# Patient Record
Sex: FEMALE | Race: WHITE | NOT HISPANIC OR LATINO | Employment: OTHER | URBAN - METROPOLITAN AREA
[De-identification: names, ages, dates, MRNs, and addresses within clinical notes are randomized per-mention and may not be internally consistent; named-entity substitution may affect disease eponyms.]

---

## 2023-03-13 DIAGNOSIS — K21.9 GASTROESOPHAGEAL REFLUX DISEASE, UNSPECIFIED WHETHER ESOPHAGITIS PRESENT: ICD-10-CM

## 2023-03-13 RX ORDER — SERTRALINE HYDROCHLORIDE 25 MG/1
25 TABLET, FILM COATED ORAL DAILY
COMMUNITY
Start: 2022-03-24 | End: 2023-03-23 | Stop reason: ALTCHOICE

## 2023-03-13 RX ORDER — EVOLOCUMAB 140 MG/ML
140 INJECTION, SOLUTION SUBCUTANEOUS
COMMUNITY
Start: 2022-01-03 | End: 2024-01-25 | Stop reason: ALTCHOICE

## 2023-03-13 RX ORDER — CLOBETASOL PROPIONATE 0.5 MG/G
0.05 CREAM TOPICAL 2 TIMES DAILY
COMMUNITY
Start: 2022-05-27 | End: 2024-01-25 | Stop reason: ALTCHOICE

## 2023-03-13 RX ORDER — MELOXICAM 7.5 MG/1
7.5 TABLET ORAL DAILY
COMMUNITY
Start: 2022-12-09 | End: 2024-01-25 | Stop reason: ALTCHOICE

## 2023-03-13 RX ORDER — OMEPRAZOLE 20 MG/1
20 CAPSULE, DELAYED RELEASE ORAL
COMMUNITY
Start: 2019-03-20 | End: 2023-03-13 | Stop reason: SDUPTHER

## 2023-03-13 RX ORDER — GABAPENTIN 300 MG/1
300 CAPSULE ORAL 3 TIMES DAILY
COMMUNITY
End: 2024-01-25 | Stop reason: ALTCHOICE

## 2023-03-13 RX ORDER — OMEPRAZOLE 20 MG/1
20 CAPSULE, DELAYED RELEASE ORAL
Qty: 90 CAPSULE | Refills: 3 | Status: SHIPPED | OUTPATIENT
Start: 2023-03-13 | End: 2023-06-19 | Stop reason: SDUPTHER

## 2023-03-13 RX ORDER — SERTRALINE HYDROCHLORIDE 50 MG/1
25 TABLET, FILM COATED ORAL DAILY
COMMUNITY
End: 2023-06-19 | Stop reason: ALTCHOICE

## 2023-03-22 PROBLEM — M51.36 NARROWING OF LUMBAR INTERVERTEBRAL DISC SPACE: Status: ACTIVE | Noted: 2023-03-22

## 2023-03-22 PROBLEM — M19.042 DEGENERATIVE ARTHRITIS OF METACARPOPHALANGEAL JOINT OF LEFT THUMB: Status: ACTIVE | Noted: 2023-03-22

## 2023-03-22 PROBLEM — M51.369 NARROWING OF LUMBAR INTERVERTEBRAL DISC SPACE: Status: ACTIVE | Noted: 2023-03-22

## 2023-03-22 PROBLEM — L40.9 PSORIASIS OF SCALP: Status: ACTIVE | Noted: 2023-03-22

## 2023-03-22 PROBLEM — E78.5 HYPERLIPIDEMIA: Status: ACTIVE | Noted: 2023-03-22

## 2023-03-22 PROBLEM — H93.12 TINNITUS OF LEFT EAR: Status: ACTIVE | Noted: 2023-03-22

## 2023-03-22 PROBLEM — Z86.73 RECENT CEREBROVASCULAR ACCIDENT (CVA): Status: ACTIVE | Noted: 2023-03-22

## 2023-03-22 PROBLEM — H90.3 BILATERAL SENSORINEURAL HEARING LOSS: Status: ACTIVE | Noted: 2023-03-22

## 2023-03-22 PROBLEM — R53.81 MALAISE AND FATIGUE: Status: ACTIVE | Noted: 2023-03-22

## 2023-03-22 PROBLEM — F51.05 INSOMNIA SECONDARY TO ANXIETY: Status: ACTIVE | Noted: 2023-03-22

## 2023-03-22 PROBLEM — R73.03 PREDIABETES: Status: ACTIVE | Noted: 2023-03-22

## 2023-03-22 PROBLEM — R53.83 MALAISE AND FATIGUE: Status: ACTIVE | Noted: 2023-03-22

## 2023-03-22 PROBLEM — M50.30 HERNIATION OF INTERVERTEBRAL DISC OF CERVICAL SPINE DUE TO DEGENERATION: Status: ACTIVE | Noted: 2023-03-22

## 2023-03-22 PROBLEM — F41.9 INSOMNIA SECONDARY TO ANXIETY: Status: ACTIVE | Noted: 2023-03-22

## 2023-03-22 PROBLEM — K21.9 GERD WITHOUT ESOPHAGITIS: Status: ACTIVE | Noted: 2023-03-22

## 2023-03-22 PROBLEM — M51.369 LUMBAR DEGENERATIVE DISC DISEASE: Status: ACTIVE | Noted: 2023-03-22

## 2023-03-22 PROBLEM — M48.07 STENOSIS OF LUMBOSACRAL SPINE: Status: ACTIVE | Noted: 2023-03-22

## 2023-03-22 PROBLEM — M51.36 LUMBAR DEGENERATIVE DISC DISEASE: Status: ACTIVE | Noted: 2023-03-22

## 2023-03-22 PROBLEM — M50.20 HERNIATION OF INTERVERTEBRAL DISC OF CERVICAL SPINE DUE TO DEGENERATION: Status: ACTIVE | Noted: 2023-03-22

## 2023-03-22 PROBLEM — F32.A CHRONIC DEPRESSION: Status: ACTIVE | Noted: 2023-03-22

## 2023-03-23 ENCOUNTER — OFFICE VISIT (OUTPATIENT)
Dept: PRIMARY CARE | Facility: CLINIC | Age: 78
End: 2023-03-23
Payer: MEDICARE

## 2023-03-23 VITALS
HEART RATE: 70 BPM | WEIGHT: 137 LBS | BODY MASS INDEX: 25.89 KG/M2 | SYSTOLIC BLOOD PRESSURE: 122 MMHG | DIASTOLIC BLOOD PRESSURE: 70 MMHG

## 2023-03-23 DIAGNOSIS — M62.830 SPASM OF MUSCLE OF LOWER BACK: ICD-10-CM

## 2023-03-23 DIAGNOSIS — K59.00 CONSTIPATION, UNSPECIFIED CONSTIPATION TYPE: ICD-10-CM

## 2023-03-23 DIAGNOSIS — R39.9 UTI SYMPTOMS: Primary | ICD-10-CM

## 2023-03-23 DIAGNOSIS — R39.12 WEAK URINARY STREAM: ICD-10-CM

## 2023-03-23 DIAGNOSIS — R10.9 FLANK PAIN: ICD-10-CM

## 2023-03-23 PROCEDURE — 1159F MED LIST DOCD IN RCRD: CPT | Performed by: FAMILY MEDICINE

## 2023-03-23 PROCEDURE — 99214 OFFICE O/P EST MOD 30 MIN: CPT | Performed by: FAMILY MEDICINE

## 2023-03-23 ASSESSMENT — PATIENT HEALTH QUESTIONNAIRE - PHQ9
1. LITTLE INTEREST OR PLEASURE IN DOING THINGS: NOT AT ALL
SUM OF ALL RESPONSES TO PHQ9 QUESTIONS 1 AND 2: 0
2. FEELING DOWN, DEPRESSED OR HOPELESS: NOT AT ALL

## 2023-03-23 NOTE — PROGRESS NOTES
Subjective   Patient ID: Padmaja Blunt is a 77 y.o. female who presents for left flank pain, constipation, and urinary symptoms.    HPI     Left flank pain/ Urinary Symptoms  Over the last 4 days she has noticed left flank pain.  She is concerned that this may be a back spasm or related to her urinary symptoms.    She does feel that her urinary stream is weaker than normal, increased frequency and urgency, but denies fever or chills.  Was denies hematuria, fever , or chills.   The pain worsens when she bends backwards at her hips.   She does have a history of degenerative changes in her lower back, and has seen pain management for cortisone injections.  She her last one was approximately 3 years ago.  Denies numbness or tingling or pain radiating into her left leg.      2. Constipation  Over the last 4 to 5 years she has noticed that her stool caliber is smaller, and occasionally will not have a bowel movement for 2 days.  Denies melena or hematochezia.  States she had a colonoscopy done approximately 2 years ago which per her was normal.    Review of Systems  All pertinent positive symptoms are included in the history of present illness.     All other systems have been reviewed and are negative and noncontributory to this patient's current ailments.  Objective   /70   Pulse 70   Wt 62.1 kg (137 lb)   BMI 25.89 kg/m²     Physical Exam  CONSTITUTIONAL - well nourished, well developed, looks like stated age, in no acute distress, not ill-appearing, and not tired appearing  SKIN - normal skin color and pigmentation, normal skin turgor without rash, lesions, or nodules visualized  HEAD - no trauma, normocephalic  EYES - normal external exam  CHEST -no distressed breathing, good effort  Abdomen -nontender, nondistended, positive bowel sounds  BACK -negative CVA tenderness, negative Celestine's punch, there is significant pinpoint tenderness located where the quadratus lumborum and obliques attach to the iliac  crest.  Her symptoms were reproduced with side bending and extension.  EXTREMITIES - no edema, no deformities  NEUROLOGICAL - normal balance, normal motor, no ataxia  PSYCHIATRIC - alert, pleasant and cordial, age-appropriate    Assessment/Plan   Left Flank Pain/muscle spasm  I suspect most of your symptoms are related to a muscle spasm of your lower back.  However, we did obtain a urinalysis and did a POCT UA in office today which did not show any leukocyte esterase, nitrites, or blood.  We will send these for formal urinalysis and microscopy with urine culture.  We will notify you of the results and make recommendations accordingly.  If your urine culture is negative, I would recommend discussing your symptoms with a Urogynecologist.  A referral was placed, and central scheduling number provided to the patient.  Recommend to use your meloxicam as needed for your back pain.    2. Constipation  We discussed options of improving your bowel habits with a daily schedule to increase your fiber intake through your diet or supplementation.  You can also purchase over-the-counter MiraLAX to help with your constipation.  I do recommend that you drink plenty of fluids.  If you notice any blood, pus, or mucous in your stool, please call the office and be seen to be further evaluated

## 2023-05-02 DIAGNOSIS — Z00.00 ROUTINE HEALTH MAINTENANCE: ICD-10-CM

## 2023-05-02 DIAGNOSIS — E78.5 HYPERLIPIDEMIA, UNSPECIFIED HYPERLIPIDEMIA TYPE: ICD-10-CM

## 2023-06-19 ENCOUNTER — OFFICE VISIT (OUTPATIENT)
Dept: PRIMARY CARE | Facility: CLINIC | Age: 78
End: 2023-06-19
Payer: MEDICARE

## 2023-06-19 VITALS
SYSTOLIC BLOOD PRESSURE: 120 MMHG | WEIGHT: 135 LBS | DIASTOLIC BLOOD PRESSURE: 70 MMHG | HEART RATE: 70 BPM | BODY MASS INDEX: 25.51 KG/M2

## 2023-06-19 DIAGNOSIS — F32.A CHRONIC DEPRESSION: ICD-10-CM

## 2023-06-19 DIAGNOSIS — G56.22 NEUROPATHY OF LEFT ULNAR NERVE AT WRIST: ICD-10-CM

## 2023-06-19 DIAGNOSIS — M79.2 NEUROPATHIC PAIN: ICD-10-CM

## 2023-06-19 DIAGNOSIS — K21.9 GERD WITHOUT ESOPHAGITIS: ICD-10-CM

## 2023-06-19 DIAGNOSIS — Z00.00 MEDICARE ANNUAL WELLNESS VISIT, SUBSEQUENT: Primary | ICD-10-CM

## 2023-06-19 DIAGNOSIS — Z23 NEED FOR TDAP VACCINATION: ICD-10-CM

## 2023-06-19 DIAGNOSIS — Z00.00 PHYSICAL EXAM, ANNUAL: ICD-10-CM

## 2023-06-19 PROCEDURE — 1159F MED LIST DOCD IN RCRD: CPT | Performed by: FAMILY MEDICINE

## 2023-06-19 PROCEDURE — 99214 OFFICE O/P EST MOD 30 MIN: CPT | Performed by: FAMILY MEDICINE

## 2023-06-19 PROCEDURE — 1170F FXNL STATUS ASSESSED: CPT | Performed by: FAMILY MEDICINE

## 2023-06-19 PROCEDURE — 1036F TOBACCO NON-USER: CPT | Performed by: FAMILY MEDICINE

## 2023-06-19 PROCEDURE — 99397 PER PM REEVAL EST PAT 65+ YR: CPT | Performed by: FAMILY MEDICINE

## 2023-06-19 PROCEDURE — 1160F RVW MEDS BY RX/DR IN RCRD: CPT | Performed by: FAMILY MEDICINE

## 2023-06-19 PROCEDURE — G0439 PPPS, SUBSEQ VISIT: HCPCS | Performed by: FAMILY MEDICINE

## 2023-06-19 RX ORDER — OMEPRAZOLE 20 MG/1
20 CAPSULE, DELAYED RELEASE ORAL
Qty: 90 CAPSULE | Refills: 1 | Status: SHIPPED | OUTPATIENT
Start: 2023-06-19 | End: 2024-01-25 | Stop reason: SDUPTHER

## 2023-06-19 RX ORDER — SERTRALINE HYDROCHLORIDE 25 MG/1
25 TABLET, FILM COATED ORAL DAILY
Qty: 90 TABLET | Refills: 1 | Status: SHIPPED | OUTPATIENT
Start: 2023-06-19 | End: 2024-01-25 | Stop reason: SDUPTHER

## 2023-06-19 ASSESSMENT — ENCOUNTER SYMPTOMS
DEPRESSION: 0
OCCASIONAL FEELINGS OF UNSTEADINESS: 0
LOSS OF SENSATION IN FEET: 0

## 2023-06-19 ASSESSMENT — ACTIVITIES OF DAILY LIVING (ADL)
TAKING_MEDICATION: INDEPENDENT
DOING_HOUSEWORK: INDEPENDENT
DRESSING: INDEPENDENT
GROCERY_SHOPPING: INDEPENDENT
MANAGING_FINANCES: INDEPENDENT
BATHING: INDEPENDENT

## 2023-06-19 NOTE — PROGRESS NOTES
Subjective   Reason for Visit: Padmaja Blunt is an 77 y.o. female here for a GERD, Anxiety, and Medicare Annual Wellness Visit Subsequent.     Past Medical, Surgical, and Family History reviewed and updated in chart.    Reviewed all medications by prescribing practitioner or clinical pharmacist (such as prescriptions, OTCs, herbal therapies and supplements) and documented in the medical record.    HPI  1.  Medicare wellness/physical exam.  Pap: last done   Mammogram: last done June 2022, no longer wishes to pursue mammogram  Colonoscopy: last done 2016, next due 2026  Immunizations: Up-to-date, Tdap 2015  Had blood work done May 2, 2023, noted on the chart    2.  Chronic depression.  Currently stable, and after last visit we decreased Zoloft from 50 mg daily to 25 mg daily  Since doing so, she has felt excellent, would like to continue 25 mg daily without cutting tablet in half    3.  GERD.  Currently stable with omeprazole, requesting refill    4.  Finger numbness.  About 2 weeks ago, she was helping scrape paint off of the floor, resting on her left hand, palmar aspect and since that time she has had numbness in her left fourth and fifth digits    5.  Nerve pain.  Has been experiencing some nerve pain that is fleeting, oftentimes for seconds, sometimes feels like a burning sensation right below the bra line in the right thoracic area without any subsequent rashes  She has received Shingrix, and is wondering if this could be shingles without the rash  Not interested in taking medication for her, just wanted to bring it up to my attention    Review of Systems  All pertinent positive symptoms are included in the history of present illness.    All other systems have been reviewed and are negative and noncontributory to this patient's current ailments.    Current Outpatient Medications   Medication Instructions    clobetasol (Temovate) 0.05 % cream 0.05 Applications, Topical, 2 times daily    gabapentin (NEURONTIN)  300 mg, oral, 3 times daily    meloxicam (MOBIC) 7.5 mg, oral, Daily    omeprazole (PRILOSEC) 20 mg, oral, Daily before breakfast    Repatha Syringe 140 mg, subcutaneous, Every 14 days    sertraline (ZOLOFT) 25 mg, oral, Daily     Allergies   Allergen Reactions    Cortisone Hives    Gabapentin Angioedema    Sulfa (Sulfonamide Antibiotics) Unknown     Immunization History   Administered Date(s) Administered    Influenza, Unspecified 12/09/2022    Moderna SARS-CoV-2 Vaccination 02/03/2021, 03/03/2021, 11/20/2021, 06/04/2022    Pneumococcal Conjugate, Unspecified 12/14/2018    Pneumococcal Polysaccharide PPSV23 08/07/2019    Tdap 05/01/2013, 08/11/2015    Zoster, Recombinant 02/01/2022, 04/18/2022    Zoster, Unspecified 07/11/2012     Past Surgical History:   Procedure Laterality Date    BREAST BIOPSY  06/09/2014    Biopsy Breast Open    COLONOSCOPY  06/09/2014    Complete Colonoscopy    MR HEAD ANGIO WO IV CONTRAST  11/29/2016    MR HEAD ANGIO WO IV CONTRAST 11/29/2016 [facility]    OTHER SURGICAL HISTORY  06/09/2014    Drainage Of Ovarian Cyst(S)    TUBAL LIGATION  06/09/2014    Tubal Ligation     Family History   Problem Relation Name Age of Onset    Hyperlipidemia Mother      Hypothyroidism Mother      Other (Cerebral hemorrhage) Father         Social History     Tobacco Use    Smoking status: Never    Smokeless tobacco: Never     Patient Self Assessment of Health Status  Patient Self Assessment: Good    Nutrition and Exercise  Current Diet: Well Balanced Diet  Adequate Fluid Intake: Yes  Caffeine: Yes  Exercise Frequency: Regularly    Functional Ability/Level of Safety  Cognitive Impairment Observed: No cognitive impairment observed  Cognitive Impairment Reported: No cognitive impairment reported by patient or family    Home Safety Risk Factors: None    Objective   Visit Vitals  /70   Pulse 70   Wt 61.2 kg (135 lb)   BMI 25.51 kg/m²   Smoking Status Never   BSA 1.62 m²      Physical  Exam  CONSTITUTIONAL - well nourished, well developed, looks like stated age, in no acute distress, not ill-appearing, and not tired appearing  SKIN - normal skin color and pigmentation, normal skin turgor without rash, lesions, or nodules visualized  HEAD - no trauma, normocephalic  EYES - pupils are equal and reactive to light, extraocular muscles are intact, and normal external exam  CHEST - clear to auscultation, no wheezing, no crackles and no rales, good effort  CARDIAC - regular rate and regular rhythm, no skipped beats, no murmur  EXTREMITIES - no edema, no deformities  NEUROLOGICAL - normal gait, normal balance, normal motor, no ataxia, alert, oriented and no focal signs, decrease sensation in left fourth and fifth digits  PSYCHIATRIC - alert, pleasant and cordial, age-appropriate    Assessment/Plan   Problem List Items Addressed This Visit       Chronic depression    Current Assessment & Plan     Stable, no changes to medication recommended         Relevant Medications    sertraline (Zoloft) 25 mg tablet    GERD without esophagitis    Current Assessment & Plan     Stable, no changes to medication recommended         Relevant Medications    omeprazole (PriLOSEC) 20 mg DR capsule    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    Current Assessment & Plan     Suspect this should improve with time  If no improvement over the next 2 to 3 weeks, notify me as I will then obtain EMG to determine if this is coming from the elbow or the wrist area  If it is coming from the elbow, ulnar nerve surgery may be warranted  The longer the nerve root is compressed, the more permanent the symptoms can become         Neuropathic pain    Current Assessment & Plan     I do not see any rash that would be indicative of shingles on your right thoracic area  This could be related to spinal cord concerns as well  Declined medication for this concern, but if it continues to persist, let me know          Other Visit Diagnoses        Medicare annual wellness visit, subsequent    -  Primary    Questions reviewed    Physical exam, annual        Complete history and physical examination was performed    Need for Tdap vaccination        Suggested getting Tdap updated at local pharmacy          Patient Care Team:  Isaias Live DO as PCP - General  Isaias Live DO as PCP - [employer] PCP

## 2023-06-19 NOTE — ASSESSMENT & PLAN NOTE
I do not see any rash that would be indicative of shingles on your right thoracic area  This could be related to spinal cord concerns as well  Declined medication for this concern, but if it continues to persist, let me know

## 2023-06-19 NOTE — ASSESSMENT & PLAN NOTE
Suspect this should improve with time  If no improvement over the next 2 to 3 weeks, notify me as I will then obtain EMG to determine if this is coming from the elbow or the wrist area  If it is coming from the elbow, ulnar nerve surgery may be warranted  The longer the nerve root is compressed, the more permanent the symptoms can become

## 2023-11-07 ENCOUNTER — OFFICE VISIT (OUTPATIENT)
Dept: PRIMARY CARE | Facility: CLINIC | Age: 78
End: 2023-11-07
Payer: MEDICARE

## 2023-11-07 DIAGNOSIS — Z23 FLU VACCINE NEED: Primary | ICD-10-CM

## 2023-11-07 PROCEDURE — 90662 IIV NO PRSV INCREASED AG IM: CPT | Performed by: FAMILY MEDICINE

## 2023-11-07 PROCEDURE — 1159F MED LIST DOCD IN RCRD: CPT | Performed by: FAMILY MEDICINE

## 2023-11-07 PROCEDURE — 1036F TOBACCO NON-USER: CPT | Performed by: FAMILY MEDICINE

## 2023-11-07 PROCEDURE — G0008 ADMIN INFLUENZA VIRUS VAC: HCPCS | Performed by: FAMILY MEDICINE

## 2023-11-07 PROCEDURE — 1160F RVW MEDS BY RX/DR IN RCRD: CPT | Performed by: FAMILY MEDICINE

## 2023-11-07 NOTE — PROGRESS NOTES
Subjective   Patient ID: Padmaja Blunt is a 78 y.o. female who presents for vaccination update(s)    Allergies   Allergen Reactions    Cortisone Hives    Gabapentin Angioedema    Sulfa (Sulfonamide Antibiotics) Unknown       Immunization History   Administered Date(s) Administered    Flu vaccine, quadrivalent, high-dose, preservative free, age 65y+ (FLUZONE) 11/07/2023    Influenza, Unspecified 10/08/2020, 12/09/2022    Moderna SARS-CoV-2 Vaccination 02/03/2021, 03/03/2021, 11/20/2021, 06/04/2022    Pneumococcal Conjugate, Unspecified 12/14/2018    Pneumococcal polysaccharide vaccine, 23-valent, age 2 years and older (PNEUMOVAX 23) 08/07/2019    Tdap vaccine, age 7 year and older (BOOSTRIX) 05/01/2013, 08/11/2015    Zoster vaccine, recombinant, adult (SHINGRIX) 02/01/2022, 04/18/2022    Zoster, Unspecified 07/11/2012       Assessment/Plan   Problem List Items Addressed This Visit       Flu vaccine need - Primary    Relevant Orders    Flu vaccine, quadrivalent, high-dose, preservative free, age 65y+ (FLUZONE) (Completed)      All questions were answered and you were counseled on the particular immunization(s) provided today

## 2024-01-25 ENCOUNTER — OFFICE VISIT (OUTPATIENT)
Dept: PRIMARY CARE | Facility: CLINIC | Age: 79
End: 2024-01-25
Payer: MEDICARE

## 2024-01-25 ENCOUNTER — LAB (OUTPATIENT)
Dept: LAB | Facility: LAB | Age: 79
End: 2024-01-25
Payer: MEDICARE

## 2024-01-25 VITALS
BODY MASS INDEX: 24.94 KG/M2 | DIASTOLIC BLOOD PRESSURE: 72 MMHG | SYSTOLIC BLOOD PRESSURE: 118 MMHG | HEART RATE: 62 BPM | WEIGHT: 132 LBS

## 2024-01-25 DIAGNOSIS — E78.2 MIXED HYPERLIPIDEMIA: ICD-10-CM

## 2024-01-25 DIAGNOSIS — M35.9 DISORDER OF CONNECTIVE TISSUE (MULTI): ICD-10-CM

## 2024-01-25 DIAGNOSIS — F32.A CHRONIC DEPRESSION: ICD-10-CM

## 2024-01-25 DIAGNOSIS — M85.89 OSTEOPENIA OF MULTIPLE SITES: ICD-10-CM

## 2024-01-25 DIAGNOSIS — Z00.00 MEDICARE ANNUAL WELLNESS VISIT, SUBSEQUENT: Primary | ICD-10-CM

## 2024-01-25 DIAGNOSIS — Z00.00 HEALTHCARE MAINTENANCE: ICD-10-CM

## 2024-01-25 DIAGNOSIS — Z78.0 POST-MENOPAUSE: ICD-10-CM

## 2024-01-25 DIAGNOSIS — K21.9 GERD WITHOUT ESOPHAGITIS: ICD-10-CM

## 2024-01-25 PROBLEM — E78.01 FAMILIAL HYPERCHOLESTEROLEMIA: Status: ACTIVE | Noted: 2024-01-25

## 2024-01-25 PROBLEM — Z23 FLU VACCINE NEED: Status: RESOLVED | Noted: 2023-11-07 | Resolved: 2024-01-25

## 2024-01-25 PROBLEM — F51.05 INSOMNIA SECONDARY TO ANXIETY: Status: RESOLVED | Noted: 2023-03-22 | Resolved: 2024-01-25

## 2024-01-25 PROBLEM — M19.019 PRIMARY LOCALIZED OSTEOARTHROSIS OF SHOULDER REGION: Status: ACTIVE | Noted: 2024-01-25

## 2024-01-25 PROBLEM — I63.9 CEREBRAL INFARCTION, UNSPECIFIED (MULTI): Status: ACTIVE | Noted: 2024-01-25

## 2024-01-25 PROBLEM — F41.9 INSOMNIA SECONDARY TO ANXIETY: Status: RESOLVED | Noted: 2023-03-22 | Resolved: 2024-01-25

## 2024-01-25 PROBLEM — H61.23 BILATERAL IMPACTED CERUMEN: Status: ACTIVE | Noted: 2020-10-27

## 2024-01-25 LAB
ALBUMIN SERPL BCP-MCNC: 4.3 G/DL (ref 3.4–5)
ALP SERPL-CCNC: 74 U/L (ref 33–136)
ALT SERPL W P-5'-P-CCNC: 7 U/L (ref 7–45)
ANION GAP SERPL CALC-SCNC: 11 MMOL/L (ref 10–20)
AST SERPL W P-5'-P-CCNC: 14 U/L (ref 9–39)
BILIRUB SERPL-MCNC: 0.5 MG/DL (ref 0–1.2)
BUN SERPL-MCNC: 17 MG/DL (ref 6–23)
CALCIUM SERPL-MCNC: 8.9 MG/DL (ref 8.6–10.3)
CHLORIDE SERPL-SCNC: 104 MMOL/L (ref 98–107)
CHOLEST SERPL-MCNC: 414 MG/DL (ref 0–199)
CHOLESTEROL/HDL RATIO: 9.6
CO2 SERPL-SCNC: 26 MMOL/L (ref 21–32)
CREAT SERPL-MCNC: 0.79 MG/DL (ref 0.5–1.05)
EGFRCR SERPLBLD CKD-EPI 2021: 77 ML/MIN/1.73M*2
GLUCOSE SERPL-MCNC: 89 MG/DL (ref 74–99)
HDLC SERPL-MCNC: 43.3 MG/DL
LDLC SERPL CALC-MCNC: 330 MG/DL
NON HDL CHOLESTEROL: 371 MG/DL (ref 0–149)
POTASSIUM SERPL-SCNC: 4.4 MMOL/L (ref 3.5–5.3)
PROT SERPL-MCNC: 7.4 G/DL (ref 6.4–8.2)
SODIUM SERPL-SCNC: 137 MMOL/L (ref 136–145)
TRIGL SERPL-MCNC: 203 MG/DL (ref 0–149)
VLDL: 41 MG/DL (ref 0–40)

## 2024-01-25 PROCEDURE — 1157F ADVNC CARE PLAN IN RCRD: CPT | Performed by: FAMILY MEDICINE

## 2024-01-25 PROCEDURE — 1160F RVW MEDS BY RX/DR IN RCRD: CPT | Performed by: FAMILY MEDICINE

## 2024-01-25 PROCEDURE — 80061 LIPID PANEL: CPT

## 2024-01-25 PROCEDURE — 1123F ACP DISCUSS/DSCN MKR DOCD: CPT | Performed by: FAMILY MEDICINE

## 2024-01-25 PROCEDURE — 1036F TOBACCO NON-USER: CPT | Performed by: FAMILY MEDICINE

## 2024-01-25 PROCEDURE — 99397 PER PM REEVAL EST PAT 65+ YR: CPT | Performed by: FAMILY MEDICINE

## 2024-01-25 PROCEDURE — 1159F MED LIST DOCD IN RCRD: CPT | Performed by: FAMILY MEDICINE

## 2024-01-25 PROCEDURE — 99213 OFFICE O/P EST LOW 20 MIN: CPT | Performed by: FAMILY MEDICINE

## 2024-01-25 PROCEDURE — 36415 COLL VENOUS BLD VENIPUNCTURE: CPT

## 2024-01-25 PROCEDURE — 1158F ADVNC CARE PLAN TLK DOCD: CPT | Performed by: FAMILY MEDICINE

## 2024-01-25 PROCEDURE — G0439 PPPS, SUBSEQ VISIT: HCPCS | Performed by: FAMILY MEDICINE

## 2024-01-25 PROCEDURE — 80053 COMPREHEN METABOLIC PANEL: CPT

## 2024-01-25 PROCEDURE — 1170F FXNL STATUS ASSESSED: CPT | Performed by: FAMILY MEDICINE

## 2024-01-25 RX ORDER — SERTRALINE HYDROCHLORIDE 25 MG/1
25 TABLET, FILM COATED ORAL DAILY
Qty: 90 TABLET | Refills: 1 | Status: SHIPPED | OUTPATIENT
Start: 2024-01-25 | End: 2024-07-23

## 2024-01-25 RX ORDER — OMEPRAZOLE 20 MG/1
20 CAPSULE, DELAYED RELEASE ORAL
Qty: 90 CAPSULE | Refills: 1 | Status: SHIPPED | OUTPATIENT
Start: 2024-01-25 | End: 2024-07-23

## 2024-01-25 ASSESSMENT — ACTIVITIES OF DAILY LIVING (ADL)
DRESSING: INDEPENDENT
BATHING: INDEPENDENT
GROCERY_SHOPPING: INDEPENDENT
MANAGING_FINANCES: INDEPENDENT
DOING_HOUSEWORK: INDEPENDENT
TAKING_MEDICATION: INDEPENDENT

## 2024-01-25 ASSESSMENT — PATIENT HEALTH QUESTIONNAIRE - PHQ9
SUM OF ALL RESPONSES TO PHQ9 QUESTIONS 1 AND 2: 3
2. FEELING DOWN, DEPRESSED OR HOPELESS: NOT AT ALL
1. LITTLE INTEREST OR PLEASURE IN DOING THINGS: NOT AT ALL
2. FEELING DOWN, DEPRESSED OR HOPELESS: NOT AT ALL
1. LITTLE INTEREST OR PLEASURE IN DOING THINGS: NEARLY EVERY DAY
SUM OF ALL RESPONSES TO PHQ9 QUESTIONS 1 AND 2: 0

## 2024-01-25 ASSESSMENT — ENCOUNTER SYMPTOMS
LOSS OF SENSATION IN FEET: 0
DEPRESSION: 0
OCCASIONAL FEELINGS OF UNSTEADINESS: 0

## 2024-01-25 NOTE — ASSESSMENT & PLAN NOTE
Due to the fact you continue to have significant elevation in your cholesterol and have not been able to tolerate statin therapy and could not afford Repatha, we chose to offer you a CT cardiac score  Please have it done at your earliest convenience after which we will make recommendations

## 2024-01-25 NOTE — PROGRESS NOTES
Subjective   Reason for Visit: Padmaja Blunt is an 78 y.o. female here for a Medicare Annual Wellness Visit Subsequent, GERD, and Anxiety.     Past Medical, Surgical, and Family History reviewed and updated in chart.    Reviewed all medications by prescribing practitioner or clinical pharmacist (such as prescriptions, OTCs, herbal therapies and supplements) and documented in the medical record.    HPI  1.  Medicare wellness/physical exam.  Pap: aged out  Mammogram: last done June 2022, no longer wishes to pursue mammogram  Colonoscopy: last done 2016, next due 2026, technically aged out, not interested in pursuing at this time  Immunizations: UTD, Tdap 2015  Dexa scan in 2021 showed osteopenia, willing to have another one done    2.  Chronic depression.  Currently stable on Zoloft 25 mg daily  Denies side effects and requesting refills     3.  GERD.  Currently stable with omeprazole, requesting refill    4.  Hyperlipidemia  Fasting for bloodwork today   Hx of intolerance to statins  Yearly checks with [employer] through her Scientologist  Interested in considering CT cardiac score  Denies CP, SOB     Review of Systems  All pertinent positive symptoms are included in the history of present illness.    All other systems have been reviewed and are negative and noncontributory to this patient's current ailments.    Past Medical History:   Diagnosis Date    Depression, unspecified 12/09/2022    Chronic depression    Encounter for general adult medical examination without abnormal findings 06/11/2021    Encounter for Medicare annual wellness exam    Encounter for general adult medical examination without abnormal findings 01/24/2020    Encounter for Medicare annual wellness exam    Hyperlipidemia, unspecified 12/09/2022    Hyperlipidemia    Other abnormal findings on diagnostic imaging of central nervous system 01/11/2017    MRI of brain abnormal    Other malaise 01/11/2017    Physical deconditioning    Personal history of  other diseases of the musculoskeletal system and connective tissue 06/11/2021    History of connective tissue disease    Personal history of other diseases of the respiratory system 11/19/2018    History of paranasal sinus congestion    Radiculopathy, lumbar region     Left lumbar radiculopathy    Restlessness and agitation 06/11/2021    Restless upper extremity     Past Surgical History:   Procedure Laterality Date    BREAST BIOPSY  06/09/2014    Biopsy Breast Open    COLONOSCOPY  06/09/2014    Complete Colonoscopy    MR HEAD ANGIO WO IV CONTRAST  11/29/2016    MR HEAD ANGIO WO IV CONTRAST 11/29/2016 [facility]    OTHER SURGICAL HISTORY  06/09/2014    Drainage Of Ovarian Cyst(S)    TUBAL LIGATION  06/09/2014    Tubal Ligation     Social History     Tobacco Use    Smoking status: Never    Smokeless tobacco: Never     Family History   Problem Relation Name Age of Onset    Hyperlipidemia Mother      Hypothyroidism Mother      Other (Cerebral hemorrhage) Father       Allergies   Allergen Reactions    Cortisone Hives    Gabapentin Angioedema    Sulfa (Sulfonamide Antibiotics) Unknown     Immunization History   Administered Date(s) Administered    Flu vaccine (IIV4), preservative free *Check age/dose* 10/16/2014    Flu vaccine, quadrivalent, high-dose, preservative free, age 65y+ (FLUZONE) 11/07/2023    Influenza, Unspecified 10/08/2020, 12/09/2022    Influenza, seasonal, injectable 10/16/2012    Moderna SARS-CoV-2 Vaccination 02/03/2021, 03/03/2021, 11/20/2021, 06/04/2022    Pneumococcal Conjugate, Unspecified 12/14/2018    Pneumococcal polysaccharide vaccine, 23-valent, age 2 years and older (PNEUMOVAX 23) 08/07/2019    Tdap vaccine, age 7 year and older (BOOSTRIX) 05/01/2013, 08/11/2015    Zoster vaccine, recombinant, adult (SHINGRIX) 02/01/2022, 04/18/2022    Zoster, Unspecified 07/11/2012     Current Outpatient Medications   Medication Instructions    omeprazole (PRILOSEC) 20 mg, oral, Daily before  breakfast    sertraline (ZOLOFT) 25 mg, oral, Daily       Patient Self Assessment of Health Status  Patient Self Assessment: Good    Nutrition and Exercise  Current Diet: Well Balanced Diet  Adequate Fluid Intake: Yes  Caffeine: Yes  Exercise Frequency: Infrequently    Functional Ability/Level of Safety  Cognitive Impairment Observed: No cognitive impairment observed  Cognitive Impairment Reported: No cognitive impairment reported by patient or family    Home Safety Risk Factors: None    Objective   Visit Vitals  /72   Pulse 62   Wt 59.9 kg (132 lb)   BMI 24.94 kg/m²   Smoking Status Never   BSA 1.61 m²      No visits with results within 1 Month(s) from this visit.   Latest known visit with results is:   Legacy Encounter on 12/09/2022   Component Date Value    TSH 12/09/2022 1.60     Cholesterol 12/09/2022 368 (H)     HDL 12/09/2022 54.2     Cholesterol/HDL Ratio 12/09/2022 6.8 (A)     LDL 12/09/2022 282 (H)     VLDL 12/09/2022 31     Triglycerides 12/09/2022 157 (H)     Glucose 12/09/2022 91     Sodium 12/09/2022 139     Potassium 12/09/2022 4.7     Chloride 12/09/2022 105     Bicarbonate 12/09/2022 29     Anion Gap 12/09/2022 10     Urea Nitrogen 12/09/2022 18     Creatinine 12/09/2022 0.72     GFR Female 12/09/2022 86     Calcium 12/09/2022 9.6     Albumin 12/09/2022 4.3     Alkaline Phosphatase 12/09/2022 84     Total Protein 12/09/2022 7.2     AST 12/09/2022 16     Total Bilirubin 12/09/2022 0.4     ALT (SGPT) 12/09/2022 8     WBC 12/09/2022 5.6     RBC 12/09/2022 4.52     Hemoglobin 12/09/2022 13.6     Hematocrit 12/09/2022 41.8     MCV 12/09/2022 92     MCHC 12/09/2022 32.5     Platelets 12/09/2022 305     RDW 12/09/2022 12.6     Neutrophils % 12/09/2022 66.3     Immature Granulocytes %,* 12/09/2022 0.2     Lymphocytes % 12/09/2022 25.3     Monocytes % 12/09/2022 5.9     Eosinophils % 12/09/2022 1.6     Basophils % 12/09/2022 0.7     Neutrophils Absolute 12/09/2022 3.70     Lymphocytes Absolute  12/09/2022 1.41     Monocytes Absolute 12/09/2022 0.33     Eosinophils Absolute 12/09/2022 0.09     Basophils Absolute 12/09/2022 0.04        Physical Exam  CONSTITUTIONAL - well nourished, well developed, looks like stated age, in no acute distress, not ill-appearing, and not tired appearing  SKIN - normal skin color and pigmentation, normal skin turgor without rash, lesions, or nodules visualized  HEAD - no trauma, normocephalic  EYES - pupils are equal and reactive to light, extraocular muscles are intact, and normal external exam  ENT - uvula midline, normal tongue movement and throat normal, no exudate, nasal passage without discharge and patent  NECK - supple without rigidity, no neck mass was observed  CHEST - clear to auscultation, no wheezing, no crackles and no rales, good effort  CARDIAC - regular rate and regular rhythm, no skipped beats, no murmur  ABDOMEN - no organomegaly, soft, nontender, nondistended, normal bowel sounds  EXTREMITIES - no obvious or evident edema, no obvious or evident deformities  NEUROLOGICAL - normal gait, normal balance, normal motor, no ataxia; alert, oriented and no focal signs  PSYCHIATRIC - alert, pleasant and cordial, age-appropriate  IMMUNOLOGIC - no cervical lymphadenopathy    Assessment/Plan   Problem List Items Addressed This Visit       Chronic depression    Current Assessment & Plan     Stable, no changes to medication recommended         Relevant Medications    sertraline (Zoloft) 25 mg tablet    GERD without esophagitis    Current Assessment & Plan     Stable, no changes to medication recommended         Relevant Medications    omeprazole (PriLOSEC) 20 mg DR capsule    Hyperlipidemia    Current Assessment & Plan     Due to the fact you continue to have significant elevation in your cholesterol and have not been able to tolerate statin therapy and could not afford Repatha, we chose to offer you a CT cardiac score  Please have it done at your earliest convenience  after which we will make recommendations         Relevant Orders    Lipid Panel    Comprehensive Metabolic Panel    CT cardiac scoring wo IV contrast    Disorder of connective tissue (CMS/HCC)    Current Assessment & Plan     Currently asymptomatic, not taking medication for this concern         Medicare annual wellness visit, subsequent - Primary    Current Assessment & Plan     Questions were answered and reviewed  Screening up-to-date  Immunizations up-to-date         Healthcare maintenance    Current Assessment & Plan     Complete history and physical examination was performed  We will notify of test results once available         Osteopenia of multiple sites    Current Assessment & Plan     You are overdue for DEXA scan, I will send an order, please schedule at your earliest convenience         Relevant Orders    XR DEXA bone density    Post-menopause    Current Assessment & Plan     You are overdue for DEXA scan, I will send an order, please schedule at your earliest convenience         Relevant Orders    XR DEXA bone density     Patient Care Team:  Isaias Live DO as PCP - General (Family Medicine)  Isaias Live DO as PCP - [employer] PCP

## 2024-01-25 NOTE — RESULT ENCOUNTER NOTE
Cholesterol 414 with  please get that CT cardiac score for me  Sugar, kidney, liver, electrolytes normal

## 2024-03-20 ENCOUNTER — HOSPITAL ENCOUNTER (OUTPATIENT)
Dept: RADIOLOGY | Facility: CLINIC | Age: 79
Discharge: HOME | End: 2024-03-20
Payer: MEDICARE

## 2024-03-20 DIAGNOSIS — M85.89 OSTEOPENIA OF MULTIPLE SITES: ICD-10-CM

## 2024-03-20 DIAGNOSIS — E78.2 MIXED HYPERLIPIDEMIA: ICD-10-CM

## 2024-03-20 DIAGNOSIS — Z78.0 POST-MENOPAUSE: ICD-10-CM

## 2024-03-20 PROCEDURE — 75571 CT HRT W/O DYE W/CA TEST: CPT

## 2024-03-20 PROCEDURE — 77080 DXA BONE DENSITY AXIAL: CPT

## 2024-03-20 NOTE — RESULT ENCOUNTER NOTE
Happy to see that your CT cardiac score is only 4.45 which means that there is almost no identifiable coronary artery plaque in your coronary arteries

## 2024-03-24 NOTE — RESULT ENCOUNTER NOTE
Your recent bone density scan has shown osteopenia, which is a condition where bone density is lower than normal. This was particularly noted in your lumbar spine (lower back) and the left femur area (thigh bone).    When compared to your previous scan, your bone density appears to be relatively stable and essentially unchanged. This is a positive sign, as it suggests that your bone density is not decreasing significantly over time.    Given these results, I recommend that you ensure your daily intake of Vitamin D is at least 2000 IU and calcium is 1500 mg. These nutrients are crucial for bone health.    Here are the specific results:    - Left femoral neck: T score of -1.7 (previous exam: -1.6)  - Left femur total: T score of -1.4 (unchanged from previous exam)  - Lumbar spine L1-L4: T score of -1.9 (previous exam: -2.0)    For your understanding, T-scores are a standard deviation from what's considered normal. A T-score between -1.0 and -2.5 indicates osteopenia, and a T-score lower than -2.5 is classified as osteoporosis. Your scores fall within the osteopenia range, which suggests you have lower than normal bone density, but not severe enough to be classified as osteoporosis.

## 2024-06-21 ENCOUNTER — OFFICE VISIT (OUTPATIENT)
Dept: PRIMARY CARE | Facility: CLINIC | Age: 79
End: 2024-06-21
Payer: MEDICARE

## 2024-06-21 VITALS
DIASTOLIC BLOOD PRESSURE: 68 MMHG | OXYGEN SATURATION: 93 % | TEMPERATURE: 98.6 F | HEART RATE: 80 BPM | SYSTOLIC BLOOD PRESSURE: 120 MMHG

## 2024-06-21 DIAGNOSIS — J34.89 SINUS DRAINAGE: ICD-10-CM

## 2024-06-21 DIAGNOSIS — J01.90 ACUTE NON-RECURRENT SINUSITIS, UNSPECIFIED LOCATION: Primary | ICD-10-CM

## 2024-06-21 PROBLEM — M51.369 LUMBAR DEGENERATIVE DISC DISEASE: Status: RESOLVED | Noted: 2023-03-22 | Resolved: 2024-06-21

## 2024-06-21 PROBLEM — Z00.00 HEALTHCARE MAINTENANCE: Status: RESOLVED | Noted: 2024-01-25 | Resolved: 2024-06-21

## 2024-06-21 PROBLEM — M48.07 STENOSIS OF LUMBOSACRAL SPINE: Status: RESOLVED | Noted: 2023-03-22 | Resolved: 2024-06-21

## 2024-06-21 PROBLEM — M51.36 LUMBAR DEGENERATIVE DISC DISEASE: Status: RESOLVED | Noted: 2023-03-22 | Resolved: 2024-06-21

## 2024-06-21 PROBLEM — M79.2 NEUROPATHIC PAIN: Status: RESOLVED | Noted: 2023-06-19 | Resolved: 2024-06-21

## 2024-06-21 PROBLEM — H61.23 BILATERAL IMPACTED CERUMEN: Status: RESOLVED | Noted: 2020-10-27 | Resolved: 2024-06-21

## 2024-06-21 PROCEDURE — 1123F ACP DISCUSS/DSCN MKR DOCD: CPT | Performed by: FAMILY MEDICINE

## 2024-06-21 PROCEDURE — 1036F TOBACCO NON-USER: CPT | Performed by: FAMILY MEDICINE

## 2024-06-21 PROCEDURE — 1159F MED LIST DOCD IN RCRD: CPT | Performed by: FAMILY MEDICINE

## 2024-06-21 PROCEDURE — 1157F ADVNC CARE PLAN IN RCRD: CPT | Performed by: FAMILY MEDICINE

## 2024-06-21 PROCEDURE — 99214 OFFICE O/P EST MOD 30 MIN: CPT | Performed by: FAMILY MEDICINE

## 2024-06-21 RX ORDER — METHYLPREDNISOLONE 4 MG/1
TABLET ORAL
Qty: 21 TABLET | Refills: 0 | Status: SHIPPED | OUTPATIENT
Start: 2024-06-21

## 2024-06-21 RX ORDER — DOXYCYCLINE 100 MG/1
100 CAPSULE ORAL 2 TIMES DAILY
Qty: 14 CAPSULE | Refills: 0 | Status: SHIPPED | OUTPATIENT
Start: 2024-06-21 | End: 2024-06-28

## 2024-06-21 ASSESSMENT — PATIENT HEALTH QUESTIONNAIRE - PHQ9
2. FEELING DOWN, DEPRESSED OR HOPELESS: NOT AT ALL
SUM OF ALL RESPONSES TO PHQ9 QUESTIONS 1 AND 2: 0
1. LITTLE INTEREST OR PLEASURE IN DOING THINGS: NOT AT ALL

## 2024-06-21 NOTE — ASSESSMENT & PLAN NOTE
Risks, benefits, and options of treatment(s) were discussed after reviewing all current medication(s) and drug allergy(ies)  I opted for the treatment that we discussed with instructions on the medication use for your underlying medical ailment(s)  I encouraged supportive care such as rest, fluids and Advil/Tylenol as warranted  Return to the clinic in 7-10 days or sooner if symptoms worsen or persist as we will then further evaluate

## 2024-06-21 NOTE — PROGRESS NOTES
"Subjective   Patient ID: Padmaja Blunt \"Mai\" is a 78 y.o. female who presents for Cough.    Past Medical, Surgical, and Family History reviewed and updated in chart.    Reviewed all medications by prescribing practitioner or clinical pharmacist (such as prescriptions, OTCs, herbal therapies and supplements) and documented in the medical record.    HPI  Mai states that her symptoms began Monday (6/17) with a non-productive cough.  Her  had mentioned symptoms that she was experiencing earlier this week at which time we recommended that she try OTC mucinex.     Mai states that the mucinex caused an increase in nasal drainage that was clear in color. She denies any fevers or chills, but has had pneumonia in the past. Her home COVID test was negative. Mai denies any shortness of breath or chest pain but does endorse a sore throat.    Review of Systems  All pertinent positive symptoms are included in the history of present illness.    All other systems have been reviewed and are negative and noncontributory to this patient's current ailments.    Past Medical History:   Diagnosis Date    Depression, unspecified 12/09/2022    Chronic depression    Encounter for general adult medical examination without abnormal findings 06/11/2021    Encounter for Medicare annual wellness exam    Encounter for general adult medical examination without abnormal findings 01/24/2020    Encounter for Medicare annual wellness exam    Hyperlipidemia, unspecified 12/09/2022    Hyperlipidemia    Other abnormal findings on diagnostic imaging of central nervous system 01/11/2017    MRI of brain abnormal    Other malaise 01/11/2017    Physical deconditioning    Personal history of other diseases of the musculoskeletal system and connective tissue 06/11/2021    History of connective tissue disease    Personal history of other diseases of the respiratory system 11/19/2018    History of paranasal sinus congestion    Radiculopathy, lumbar " region     Left lumbar radiculopathy    Restlessness and agitation 06/11/2021    Restless upper extremity     Past Surgical History:   Procedure Laterality Date    BREAST BIOPSY  06/09/2014    Biopsy Breast Open    COLONOSCOPY  06/09/2014    Complete Colonoscopy    MR HEAD ANGIO WO IV CONTRAST  11/29/2016    MR HEAD ANGIO WO IV CONTRAST 11/29/2016 [facility]    OTHER SURGICAL HISTORY  06/09/2014    Drainage Of Ovarian Cyst(S)    TUBAL LIGATION  06/09/2014    Tubal Ligation     Social History     Tobacco Use    Smoking status: Never    Smokeless tobacco: Never     Family History   Problem Relation Name Age of Onset    Hyperlipidemia Mother      Hypothyroidism Mother      Other (Cerebral hemorrhage) Father       Immunization History   Administered Date(s) Administered    Flu vaccine (IIV4), preservative free *Check age/dose* 10/16/2014    Flu vaccine, quadrivalent, high-dose, preservative free, age 65y+ (FLUZONE) 11/07/2023    Influenza, Unspecified 10/08/2020, 12/09/2022    Influenza, seasonal, injectable 10/16/2012    Moderna SARS-CoV-2 Vaccination 02/03/2021, 03/03/2021, 11/20/2021, 06/04/2022    Pneumococcal Conjugate, Unspecified 12/14/2018    Pneumococcal polysaccharide vaccine, 23-valent, age 2 years and older (PNEUMOVAX 23) 08/07/2019    Tdap vaccine, age 7 year and older (BOOSTRIX, ADACEL) 05/01/2013, 08/11/2015    Zoster vaccine, recombinant, adult (SHINGRIX) 02/01/2022, 04/18/2022    Zoster, Unspecified 07/11/2012     Current Outpatient Medications   Medication Instructions    doxycycline (VIBRAMYCIN) 100 mg, oral, 2 times daily, Take with at least 8 ounces (large glass) of water, do not lie down for 30 minutes after    methylPREDNISolone (Medrol, Rad,) 4 mg tablets Follow schedule on package instructions    omeprazole (PRILOSEC) 20 mg, oral, Daily before breakfast    sertraline (ZOLOFT) 25 mg, oral, Daily     Allergies   Allergen Reactions    Cortisone Hives    Gabapentin Angioedema    Sulfa  (Sulfonamide Antibiotics) Unknown     Objective   Vitals:    06/21/24 1125   BP: 120/68   BP Location: Left arm   Patient Position: Sitting   BP Cuff Size: Adult   Pulse: 80   Temp: 37 °C (98.6 °F)   SpO2: 93%     There is no height or weight on file to calculate BMI.    BP Readings from Last 3 Encounters:   06/21/24 120/68   01/25/24 118/72   06/19/23 120/70      Wt Readings from Last 3 Encounters:   01/25/24 59.9 kg (132 lb)   06/19/23 61.2 kg (135 lb)   03/23/23 62.1 kg (137 lb)        No visits with results within 1 Month(s) from this visit.   Latest known visit with results is:   Lab on 01/25/2024   Component Date Value    Cholesterol 01/25/2024 414 (H)     HDL-Cholesterol 01/25/2024 43.3     Cholesterol/HDL Ratio 01/25/2024 9.6     LDL Calculated 01/25/2024 330 (H)     VLDL 01/25/2024 41 (H)     Triglycerides 01/25/2024 203 (H)     Non HDL Cholesterol 01/25/2024 371 (H)     Glucose 01/25/2024 89     Sodium 01/25/2024 137     Potassium 01/25/2024 4.4     Chloride 01/25/2024 104     Bicarbonate 01/25/2024 26     Anion Gap 01/25/2024 11     Urea Nitrogen 01/25/2024 17     Creatinine 01/25/2024 0.79     eGFR 01/25/2024 77     Calcium 01/25/2024 8.9     Albumin 01/25/2024 4.3     Alkaline Phosphatase 01/25/2024 74     Total Protein 01/25/2024 7.4     AST 01/25/2024 14     Bilirubin, Total 01/25/2024 0.5     ALT 01/25/2024 7      Physical Exam  CONSTITUTIONAL - slightly ill-appearing female in no acute distress  SKIN - tan skin with one pink fleshy colored nodule on her right forearm   HEAD - no trauma, normocephalic  EYES - extraocular muscles are intact, and normal external exam  ENT - uvula midline, normal tongue movement and throat normal, no exudate, nasal passage without discharge and patent  NECK - supple without rigidity, no neck mass was observed, no thyromegaly or thyroid nodules  CHEST - clear to auscultation, no wheezing, no crackles and no rales, good effort  CARDIAC - regular rate and regular rhythm,  no skipped beats, no murmur  EXTREMITIES - no obvious or evident edema, no obvious or evident deformities  NEUROLOGICAL - normal gait, normal balance, normal motor, alert, oriented and no focal signs  PSYCHIATRIC - alert, pleasant and cordial, age-appropriate    Assessment/Plan   Problem List Items Addressed This Visit       Acute non-recurrent sinusitis - Primary     Risks, benefits, and options of treatment(s) were discussed after reviewing all current medication(s) and drug allergy(ies)  I opted for the treatment that we discussed with instructions on the medication use for your underlying medical ailment(s)  I encouraged supportive care such as rest, fluids and Advil/Tylenol as warranted  Return to the clinic in 7-10 days or sooner if symptoms worsen or persist as we will then further evaluate          Relevant Medications    doxycycline (Vibramycin) 100 mg capsule    methylPREDNISolone (Medrol, Rad,) 4 mg tablets    Sinus drainage     An antibiotic and steroid taper have both been sent to the pharmacy.          Relevant Medications    doxycycline (Vibramycin) 100 mg capsule    methylPREDNISolone (Medrol, Rad,) 4 mg tablets

## 2024-08-06 ENCOUNTER — APPOINTMENT (OUTPATIENT)
Dept: PRIMARY CARE | Facility: CLINIC | Age: 79
End: 2024-08-06
Payer: MEDICARE

## 2024-08-07 ENCOUNTER — APPOINTMENT (OUTPATIENT)
Dept: PRIMARY CARE | Facility: CLINIC | Age: 79
End: 2024-08-07
Payer: MEDICARE

## 2024-08-07 VITALS
HEIGHT: 62 IN | WEIGHT: 137 LBS | SYSTOLIC BLOOD PRESSURE: 118 MMHG | DIASTOLIC BLOOD PRESSURE: 68 MMHG | BODY MASS INDEX: 25.21 KG/M2 | HEART RATE: 98 BPM

## 2024-08-07 DIAGNOSIS — F32.A CHRONIC DEPRESSION: ICD-10-CM

## 2024-08-07 DIAGNOSIS — K21.9 GERD WITHOUT ESOPHAGITIS: ICD-10-CM

## 2024-08-07 DIAGNOSIS — E78.2 MIXED HYPERLIPIDEMIA: Primary | ICD-10-CM

## 2024-08-07 PROCEDURE — 1123F ACP DISCUSS/DSCN MKR DOCD: CPT | Performed by: FAMILY MEDICINE

## 2024-08-07 PROCEDURE — 1160F RVW MEDS BY RX/DR IN RCRD: CPT | Performed by: FAMILY MEDICINE

## 2024-08-07 PROCEDURE — 99214 OFFICE O/P EST MOD 30 MIN: CPT | Performed by: FAMILY MEDICINE

## 2024-08-07 PROCEDURE — 1036F TOBACCO NON-USER: CPT | Performed by: FAMILY MEDICINE

## 2024-08-07 PROCEDURE — 1157F ADVNC CARE PLAN IN RCRD: CPT | Performed by: FAMILY MEDICINE

## 2024-08-07 PROCEDURE — 1159F MED LIST DOCD IN RCRD: CPT | Performed by: FAMILY MEDICINE

## 2024-08-07 RX ORDER — SERTRALINE HYDROCHLORIDE 25 MG/1
25 TABLET, FILM COATED ORAL DAILY
Qty: 90 TABLET | Refills: 1 | Status: SHIPPED | OUTPATIENT
Start: 2024-08-07 | End: 2025-02-03

## 2024-08-07 RX ORDER — OMEPRAZOLE 20 MG/1
20 CAPSULE, DELAYED RELEASE ORAL
Qty: 90 CAPSULE | Refills: 1 | Status: SHIPPED
Start: 2024-08-07 | End: 2024-08-07

## 2024-08-07 RX ORDER — OMEPRAZOLE 20 MG/1
20 CAPSULE, DELAYED RELEASE ORAL
Qty: 90 CAPSULE | Refills: 1 | Status: SHIPPED | OUTPATIENT
Start: 2024-08-07 | End: 2025-02-03

## 2024-08-07 RX ORDER — SERTRALINE HYDROCHLORIDE 25 MG/1
25 TABLET, FILM COATED ORAL DAILY
Qty: 90 TABLET | Refills: 1 | Status: SHIPPED
Start: 2024-08-07 | End: 2024-08-07

## 2024-08-07 NOTE — PROGRESS NOTES
"Subjective   Patient ID: Padmaja Blunt \"Alan" is a 78 y.o. female who presents for Depression, GERD, and Hyperlipidemia.    Past Medical, Surgical, and Family History reviewed and updated in chart.    Reviewed all medications by prescribing practitioner or clinical pharmacist (such as prescriptions, OTCs, herbal therapies and supplements) and documented in the medical record.    HPI  1.  Chronic depression.  Currently stable on Zoloft 25 mg daily  Denies mood changes, SI/HI  Denies side effects and requesting refills     2.  GERD.  Currently stable with omeprazole, requesting refill     3.  Hyperlipidemia  Not fasting for labwork today but will get tomorrow   CT calcium score March 2024 with score of 3, 'minimal identifiable risk'  Yearly checks with [facility] through her Religion; most recent from last month shows normal AAA, carotid artery and PAD screens  Hx of intolerance to statins  Denies CP, SOB    Review of Systems  All pertinent positive symptoms are included in the history of present illness.    All other systems have been reviewed and are negative and noncontributory to this patient's current ailments.    Past Medical History:   Diagnosis Date    Depression, unspecified 12/09/2022    Chronic depression    Encounter for general adult medical examination without abnormal findings 06/11/2021    Encounter for Medicare annual wellness exam    Encounter for general adult medical examination without abnormal findings 01/24/2020    Encounter for Medicare annual wellness exam    Hyperlipidemia, unspecified 12/09/2022    Hyperlipidemia    Other abnormal findings on diagnostic imaging of central nervous system 01/11/2017    MRI of brain abnormal    Other malaise 01/11/2017    Physical deconditioning    Personal history of other diseases of the musculoskeletal system and connective tissue 06/11/2021    History of connective tissue disease    Personal history of other diseases of the respiratory system 11/19/2018    " "History of paranasal sinus congestion    Radiculopathy, lumbar region     Left lumbar radiculopathy    Restlessness and agitation 06/11/2021    Restless upper extremity     Past Surgical History:   Procedure Laterality Date    BREAST BIOPSY  06/09/2014    Biopsy Breast Open    COLONOSCOPY  06/09/2014    Complete Colonoscopy    MR HEAD ANGIO WO IV CONTRAST  11/29/2016    MR HEAD ANGIO WO IV CONTRAST 11/29/2016 [facility]    OTHER SURGICAL HISTORY  06/09/2014    Drainage Of Ovarian Cyst(S)    TUBAL LIGATION  06/09/2014    Tubal Ligation     Social History     Tobacco Use    Smoking status: Never    Smokeless tobacco: Never     Family History   Problem Relation Name Age of Onset    Hyperlipidemia Mother      Hypothyroidism Mother      Other (Cerebral hemorrhage) Father       Immunization History   Administered Date(s) Administered    Flu vaccine (IIV4), preservative free *Check age/dose* 10/16/2014    Flu vaccine, quadrivalent, high-dose, preservative free, age 65y+ (FLUZONE) 11/07/2023    Influenza, Unspecified 10/08/2020, 12/09/2022    Influenza, seasonal, injectable 10/16/2012    Moderna SARS-CoV-2 Vaccination 02/03/2021, 03/03/2021, 11/20/2021, 06/04/2022    Pneumococcal Conjugate, Unspecified 12/14/2018    Pneumococcal polysaccharide vaccine, 23-valent, age 2 years and older (PNEUMOVAX 23) 08/07/2019    Tdap vaccine, age 7 year and older (BOOSTRIX, ADACEL) 05/01/2013, 08/11/2015    Zoster vaccine, recombinant, adult (SHINGRIX) 02/01/2022, 04/18/2022    Zoster, Unspecified 07/11/2012     Current Outpatient Medications   Medication Instructions    omeprazole (PRILOSEC) 20 mg, oral, Daily before breakfast    sertraline (ZOLOFT) 25 mg, oral, Daily     Allergies   Allergen Reactions    Cortisone Hives    Gabapentin Angioedema    Sulfa (Sulfonamide Antibiotics) Unknown       Objective   Vitals:    08/07/24 1023   BP: 118/68   Pulse: 98   Weight: 62.1 kg (137 lb)   Height: 1.575 m (5' 2\")     Body mass index is " 25.06 kg/m².    BP Readings from Last 3 Encounters:   08/07/24 118/68   06/21/24 120/68   01/25/24 118/72      Wt Readings from Last 3 Encounters:   08/07/24 62.1 kg (137 lb)   01/25/24 59.9 kg (132 lb)   06/19/23 61.2 kg (135 lb)        No visits with results within 1 Month(s) from this visit.   Latest known visit with results is:   Lab on 01/25/2024   Component Date Value    Cholesterol 01/25/2024 414 (H)     HDL-Cholesterol 01/25/2024 43.3     Cholesterol/HDL Ratio 01/25/2024 9.6     LDL Calculated 01/25/2024 330 (H)     VLDL 01/25/2024 41 (H)     Triglycerides 01/25/2024 203 (H)     Non HDL Cholesterol 01/25/2024 371 (H)     Glucose 01/25/2024 89     Sodium 01/25/2024 137     Potassium 01/25/2024 4.4     Chloride 01/25/2024 104     Bicarbonate 01/25/2024 26     Anion Gap 01/25/2024 11     Urea Nitrogen 01/25/2024 17     Creatinine 01/25/2024 0.79     eGFR 01/25/2024 77     Calcium 01/25/2024 8.9     Albumin 01/25/2024 4.3     Alkaline Phosphatase 01/25/2024 74     Total Protein 01/25/2024 7.4     AST 01/25/2024 14     Bilirubin, Total 01/25/2024 0.5     ALT 01/25/2024 7      Physical Exam  CONSTITUTIONAL - well nourished, well developed, looks like stated age, in no acute distress, not ill-appearing, and not tired appearing  HEAD - no trauma, normocephalic  NECK - supple without rigidity, no neck mass was observed, no thyromegaly or thyroid nodules  CHEST - clear to auscultation, no wheezing, no crackles and no rales, good effort  CARDIAC - regular rate and regular rhythm, no skipped beats, no murmur  NEUROLOGICAL - normal gait, normal balance, normal motor, no ataxia; alert, oriented and no focal signs  PSYCHIATRIC - alert, pleasant and cordial, age-appropriate  IMMUNOLOGIC - no cervical lymphadenopathy    Assessment/Plan   Problem List Items Addressed This Visit       Chronic depression     Stable. No changes today. Refills ordered.         Relevant Medications    sertraline (Zoloft) 25 mg tablet    GERD  without esophagitis     Stable. No changes. Refills ordered.         Relevant Medications    omeprazole (PriLOSEC) 20 mg DR capsule    Hyperlipidemia - Primary     As we discussed, your cholesterol levels remain chronically elevated. However, I am pleased to note that your recent CT calcium score, along with the [facility] screening results you provided, indicates that you have minimal risk for cardiovascular events at this time.    Given your history of intolerance to cholesterol-lowering medications, we will continue to defer pharmacological treatment. It is important to maintain your current lifestyle, including staying active and adhering to a well-balanced diet, to help manage your cholesterol levels.    Please obtain a fasting lipid panel to monitor your overall cholesterol levels and assess any changes. Additionally, we discussed the potential benefits of taking an over-the-counter fish oil supplement, which may have a moderate impact on your cholesterol and triglyceride levels.    Please continue with your healthy lifestyle choices, and we will review your lipid panel results once they are available.         Relevant Orders    Comprehensive metabolic panel    Lipid panel

## 2024-08-07 NOTE — ASSESSMENT & PLAN NOTE
As we discussed, your cholesterol levels remain chronically elevated. However, I am pleased to note that your recent CT calcium score, along with the [facility] screening results you provided, indicates that you have minimal risk for cardiovascular events at this time.    Given your history of intolerance to cholesterol-lowering medications, we will continue to defer pharmacological treatment. It is important to maintain your current lifestyle, including staying active and adhering to a well-balanced diet, to help manage your cholesterol levels.    Please obtain a fasting lipid panel to monitor your overall cholesterol levels and assess any changes. Additionally, we discussed the potential benefits of taking an over-the-counter fish oil supplement, which may have a moderate impact on your cholesterol and triglyceride levels.    Please continue with your healthy lifestyle choices, and we will review your lipid panel results once they are available.

## 2024-08-08 ENCOUNTER — LAB (OUTPATIENT)
Dept: LAB | Facility: LAB | Age: 79
End: 2024-08-08
Payer: MEDICARE

## 2024-08-08 DIAGNOSIS — E78.2 MIXED HYPERLIPIDEMIA: ICD-10-CM

## 2024-08-08 LAB
ALBUMIN SERPL BCP-MCNC: 4.3 G/DL (ref 3.4–5)
ALP SERPL-CCNC: 81 U/L (ref 33–136)
ALT SERPL W P-5'-P-CCNC: 9 U/L (ref 7–45)
ANION GAP SERPL CALC-SCNC: 12 MMOL/L (ref 10–20)
AST SERPL W P-5'-P-CCNC: 18 U/L (ref 9–39)
BILIRUB SERPL-MCNC: 0.5 MG/DL (ref 0–1.2)
BUN SERPL-MCNC: 23 MG/DL (ref 6–23)
CALCIUM SERPL-MCNC: 9.7 MG/DL (ref 8.6–10.6)
CHLORIDE SERPL-SCNC: 106 MMOL/L (ref 98–107)
CHOLEST SERPL-MCNC: 380 MG/DL (ref 0–199)
CHOLESTEROL/HDL RATIO: 9
CO2 SERPL-SCNC: 26 MMOL/L (ref 21–32)
CREAT SERPL-MCNC: 0.73 MG/DL (ref 0.5–1.05)
EGFRCR SERPLBLD CKD-EPI 2021: 84 ML/MIN/1.73M*2
GLUCOSE SERPL-MCNC: 87 MG/DL (ref 74–99)
HDLC SERPL-MCNC: 42.1 MG/DL
LDLC SERPL CALC-MCNC: 293 MG/DL
NON HDL CHOLESTEROL: 338 MG/DL (ref 0–149)
POTASSIUM SERPL-SCNC: 4.2 MMOL/L (ref 3.5–5.3)
PROT SERPL-MCNC: 7.6 G/DL (ref 6.4–8.2)
SODIUM SERPL-SCNC: 140 MMOL/L (ref 136–145)
TRIGL SERPL-MCNC: 225 MG/DL (ref 0–149)
VLDL: 45 MG/DL (ref 0–40)

## 2024-08-08 PROCEDURE — 80053 COMPREHEN METABOLIC PANEL: CPT

## 2024-08-08 PROCEDURE — 80061 LIPID PANEL: CPT

## 2024-08-08 PROCEDURE — 36415 COLL VENOUS BLD VENIPUNCTURE: CPT

## 2024-08-10 NOTE — RESULT ENCOUNTER NOTE
Cholesterol 380 with  previously 414 and 330  Sugar, kidney, liver, electrolytes normal  I know in the past we have talked about all the trouble we have had with the cholesterol medication including the pills and then of course the Repatha injection which was cost prohibitive, so at this juncture there is not much else to recommend.  Not sure if we have tried Zetia 10 mg daily in the past, but that is very benign, very cheap, and if you want to try it we can certainly do it because as the same goes, with your data, I would rather have you on something then nothing

## 2024-09-24 ENCOUNTER — APPOINTMENT (OUTPATIENT)
Dept: AUDIOLOGY | Facility: CLINIC | Age: 79
End: 2024-09-24
Payer: MEDICARE

## 2024-09-24 DIAGNOSIS — H90.A22 SENSORINEURAL HEARING LOSS (SNHL) OF LEFT EAR WITH RESTRICTED HEARING OF RIGHT EAR: Primary | ICD-10-CM

## 2024-09-24 NOTE — PROGRESS NOTES
"HEARING AID CHECK    Name:  Padmaja Blunt \"Mai\"  :  1945  Age:  78 y.o.    HEARING AID INFORMATION:    Right Hearing Aid  Oticon OPN S 3 miniRITE-R  SN: [id number]  Warranty:     SN: Unknown   Length   Right: 2(85)  Dome/Earmold  Right: 8 mm open  Wax Filter  ProWax miniFit  Battery Size  Rechargeable  TLC Expiration:        HISTORY:    Patient seen for check of the above device. She reported that her device has not been holding a charge, and she also was not able to hear well with it.    EXAM/PROCEDURES:    Visual inspection revealed significant cerumen accumulation in the wax guard. Initial listening check revealed decreased amplification. Cleaned and checked hearing aids.  Vacuumed cailin ports and  ports. Replaced wax filters and domes. Final listening check indicated adequate sound quality and function with no distortion of internal feedback. Patient reported improvement in sound quality following clean and check. Connected devices to fitting software. Confirmed no firmware update was needed.      Replaced battery and confirmed good battery health on the fitting software. Patient paid for new battery and hearing aid check appointment.    Patient inquired about advance his hearing technology.  Counseled patient that Oticon has since come out with several generations newer than what she currently has.  Additionally, a recheck of her hearing would be helpful to determine whether or not she would be a candidate for a CROS hearing aid or not.  Patient still has [employer] insurance with [email or web address].  Therefore, she likely still has a $3000 benefit towards hearing aids.  A recheck of her hearing and hearing needs assessment can be scheduled.    RECOMMENDATIONS AND FOLLOW-UP:    - Continue use of amplification and follow-up as recommended for hearing aid maintenance and adjustments.  - Schedule appointment for hearing test and hearing needs assessment.  - " Recommended 6-month follow-up HAC. Patient to contact the office sooner if problems arise.  - Monitor and recheck hearing as warranted.  - Counseled regarding results and recommendations.      STONE Vann.  Audiology Student Extern    Carlos Eduardo Borja  Clinical Audiologist

## 2024-10-04 ENCOUNTER — APPOINTMENT (OUTPATIENT)
Dept: PRIMARY CARE | Facility: CLINIC | Age: 79
End: 2024-10-04
Payer: MEDICARE

## 2024-10-04 DIAGNOSIS — Z23 FLU VACCINE NEED: Primary | ICD-10-CM

## 2024-10-04 NOTE — PROGRESS NOTES
"Subjective   Patient ID: Padmaja Blunt \"Mai\" is a 79 y.o. female who presents for a vaccination update(s)    Allergies   Allergen Reactions    Cortisone Hives    Gabapentin Angioedema    Sulfa (Sulfonamide Antibiotics) Unknown       Immunization History   Administered Date(s) Administered    Flu vaccine (IIV4), preservative free *Check age/dose* 10/16/2014    Flu vaccine, quadrivalent, high-dose, preservative free, age 65y+ (FLUZONE) 11/07/2023    Influenza, Unspecified 10/08/2020, 12/09/2022    Influenza, seasonal, injectable 10/16/2012    Moderna SARS-CoV-2 Vaccination 02/03/2021, 03/03/2021, 11/20/2021, 06/04/2022    Pneumococcal Conjugate, Unspecified 12/14/2018    Pneumococcal polysaccharide vaccine, 23-valent, age 2 years and older (PNEUMOVAX 23) 08/07/2019    Tdap vaccine, age 7 year and older (BOOSTRIX, ADACEL) 05/01/2013, 08/11/2015    Zoster vaccine, recombinant, adult (SHINGRIX) 02/01/2022, 04/18/2022    Zoster, Unspecified 07/11/2012       Assessment/Plan   Problem List Items Addressed This Visit    None  Visit Diagnoses       Flu vaccine need    -  Primary    Relevant Orders    Flu vaccine, trivalent, preservative free, HIGH-DOSE, age 65y+ (Fluzone)           All questions were answered and you were counseled on the particular immunization(s) provided today  "

## 2024-10-29 ENCOUNTER — APPOINTMENT (OUTPATIENT)
Dept: AUDIOLOGY | Facility: CLINIC | Age: 79
End: 2024-10-29
Payer: MEDICARE

## 2024-11-21 ENCOUNTER — APPOINTMENT (OUTPATIENT)
Dept: AUDIOLOGY | Facility: CLINIC | Age: 79
End: 2024-11-21
Payer: MEDICARE

## 2025-01-23 ENCOUNTER — OFFICE VISIT (OUTPATIENT)
Dept: URGENT CARE | Age: 80
End: 2025-01-23
Payer: MEDICARE

## 2025-01-23 VITALS — WEIGHT: 137.4 LBS | HEART RATE: 94 BPM | BODY MASS INDEX: 25.13 KG/M2 | OXYGEN SATURATION: 97 %

## 2025-01-23 DIAGNOSIS — S61.216A LACERATION OF RIGHT LITTLE FINGER WITHOUT FOREIGN BODY WITHOUT DAMAGE TO NAIL, INITIAL ENCOUNTER: Primary | ICD-10-CM

## 2025-01-23 NOTE — PROGRESS NOTES
"Subjective   Patient ID: Padmaja Blunt \"Mai\" is a 79 y.o. female. They present today with a chief complaint of Injury (Right finger pinky cut ).    History of Present Illness  78 yo female coming in for a laceration tot he right 5th digit. She states she was helping her son hang some blinds and got cut on one of the metal pieces. She denies any pain. She states she is not sure when her last TD was.     Past Medical History  Allergies as of 01/23/2025 - Reviewed 01/23/2025   Allergen Reaction Noted    Cortisone Hives 02/07/2002    Gabapentin Angioedema 03/22/2023    Sulfa (sulfonamide antibiotics) Unknown 03/22/2023       (Not in a hospital admission)       Past Medical History:   Diagnosis Date    Depression, unspecified 12/09/2022    Chronic depression    Encounter for general adult medical examination without abnormal findings 06/11/2021    Encounter for Medicare annual wellness exam    Encounter for general adult medical examination without abnormal findings 01/24/2020    Encounter for Medicare annual wellness exam    Hyperlipidemia, unspecified 12/09/2022    Hyperlipidemia    Other abnormal findings on diagnostic imaging of central nervous system 01/11/2017    MRI of brain abnormal    Other malaise 01/11/2017    Physical deconditioning    Personal history of other diseases of the musculoskeletal system and connective tissue 06/11/2021    History of connective tissue disease    Personal history of other diseases of the respiratory system 11/19/2018    History of paranasal sinus congestion    Radiculopathy, lumbar region     Left lumbar radiculopathy    Restlessness and agitation 06/11/2021    Restless upper extremity       Past Surgical History:   Procedure Laterality Date    BREAST BIOPSY  06/09/2014    Biopsy Breast Open    COLONOSCOPY  06/09/2014    Complete Colonoscopy    MR HEAD ANGIO WO IV CONTRAST  11/29/2016    MR HEAD ANGIO WO IV CONTRAST 11/29/2016 [facility]    OTHER SURGICAL HISTORY  " 06/09/2014    Drainage Of Ovarian Cyst(S)    TUBAL LIGATION  06/09/2014    Tubal Ligation        reports that she has never smoked. She has never used smokeless tobacco.    Review of Systems  Review of Systems:  General: No weight loss, fatigue, anorexia, insomnia, fever, chills.  Pulmonary:  No shortness of breath, cough, hemoptysis  Heme/lymph: No swollen glands, fever, bleeding  Musculoskeletal: No limb pain, joint pain, joint swelling.  Skin: No rashes. Positive right 5th digit laceration  Neuro: No numbness, tingling, headaches                                 Objective    Vitals:    01/23/25 1705   Pulse: 94   SpO2: 97%   Weight: 62.3 kg (137 lb 6.4 oz)     No LMP recorded.    Physical Exam  Hand Laceration:   General: Vitals noted, no distress. Afebrile  Cardiac: Regular rate and rhythm, no murmur.   Pulmonary: Lungs clear bilaterally with good aeration. No adventitious breath sounds.   Skin: No rash  Exam of the right  hand shows a 0.25 centimeter laceration over the volar tip of the 5th digit. It appears to be fairly superficial. There are no foreign bodies. Is neurovascularly intact distally. Specifically, has full strength with flexion and extension. Was examined through full range of motion with no obvious tendon injury.       Laceration Repair    Date/Time: 1/23/2025 5:16 PM    Performed by: WILFREDO Childress  Authorized by: WILFREDO Childress    Consent:     Consent obtained:  Verbal    Consent given by:  Patient    Risks, benefits, and alternatives were discussed: yes      Risks discussed:  Pain, poor wound healing and infection    Alternatives discussed:  No treatment  Universal protocol:     Procedure explained and questions answered to patient or proxy's satisfaction: yes      Patient identity confirmed:  Verbally with patient  Anesthesia:     Anesthesia method:  None  Laceration details:     Location:  Finger    Finger location:  R small finger    Length (cm):  0.3  Pre-procedure  details:     Preparation:  Patient was prepped and draped in usual sterile fashion  Exploration:     Limited defect created (wound extended): no      Contaminated: no    Treatment:     Area cleansed with:  Soap and water    Amount of cleaning:  Standard  Skin repair:     Repair method:  Tissue adhesive  Approximation:     Approximation:  Close  Repair type:     Repair type:  Simple  Post-procedure details:     Dressing:  Adhesive bandage    Procedure completion:  Tolerated      Point of Care Test & Imaging Results from this visit  No results found for this visit on 01/23/25.   No results found.    Diagnostic study results (if any) were reviewed by WILFREDO Childress.    Assessment/Plan   Allergies, medications, history, and pertinent labs/EKGs/Imaging reviewed by WILFREDO Childress.     Medical Decision Making  Treatment: Laceration repair  Differential: 1) finger laceration , 2) puncture wound , 3) skin avulsion  Plan: Patient will follow up with the PCP in the next 2-3 days. Return for any worsening symptoms or go to the ER for further evaluation. Patient understands return precautions and discharge insturctions.  Impression:   1) right 5th digit laceration      Orders and Diagnoses  Diagnoses and all orders for this visit:  Laceration of right little finger without foreign body without damage to nail, initial encounter  Other orders  -     Td vaccine, age 7 years and older (TENIVAC)  -     Laceration Repair      Medical Admin Record      Patient disposition: Home    Electronically signed by WILFREDO Childress  5:17 PM

## 2025-02-06 DIAGNOSIS — F32.A CHRONIC DEPRESSION: ICD-10-CM

## 2025-02-06 DIAGNOSIS — K21.9 GERD WITHOUT ESOPHAGITIS: ICD-10-CM

## 2025-02-07 RX ORDER — SERTRALINE HYDROCHLORIDE 25 MG/1
25 TABLET, FILM COATED ORAL DAILY
Qty: 30 TABLET | Refills: 0 | Status: SHIPPED | OUTPATIENT
Start: 2025-02-07 | End: 2025-03-09

## 2025-02-07 RX ORDER — OMEPRAZOLE 20 MG/1
20 CAPSULE, DELAYED RELEASE ORAL
Qty: 30 CAPSULE | Refills: 0 | Status: SHIPPED | OUTPATIENT
Start: 2025-02-07 | End: 2025-03-09

## 2025-02-12 ENCOUNTER — APPOINTMENT (OUTPATIENT)
Dept: PRIMARY CARE | Facility: CLINIC | Age: 80
End: 2025-02-12
Payer: MEDICARE

## 2025-02-12 VITALS
WEIGHT: 132 LBS | HEIGHT: 62 IN | DIASTOLIC BLOOD PRESSURE: 72 MMHG | BODY MASS INDEX: 24.29 KG/M2 | SYSTOLIC BLOOD PRESSURE: 120 MMHG | HEART RATE: 66 BPM

## 2025-02-12 DIAGNOSIS — Z23 NEED FOR PNEUMOCOCCAL 20-VALENT CONJUGATE VACCINATION: ICD-10-CM

## 2025-02-12 DIAGNOSIS — F32.A CHRONIC DEPRESSION: ICD-10-CM

## 2025-02-12 DIAGNOSIS — Z00.00 MEDICARE ANNUAL WELLNESS VISIT, SUBSEQUENT: Primary | ICD-10-CM

## 2025-02-12 DIAGNOSIS — Z00.00 PHYSICAL EXAM, ANNUAL: ICD-10-CM

## 2025-02-12 DIAGNOSIS — E78.2 MODERATE MIXED HYPERLIPIDEMIA NOT REQUIRING STATIN THERAPY: ICD-10-CM

## 2025-02-12 DIAGNOSIS — Z23 FLU VACCINE NEED: ICD-10-CM

## 2025-02-12 DIAGNOSIS — K21.9 GERD WITHOUT ESOPHAGITIS: ICD-10-CM

## 2025-02-12 PROBLEM — J01.90 ACUTE NON-RECURRENT SINUSITIS: Status: RESOLVED | Noted: 2024-06-21 | Resolved: 2025-02-12

## 2025-02-12 PROBLEM — J34.89 SINUS DRAINAGE: Status: RESOLVED | Noted: 2024-06-21 | Resolved: 2025-02-12

## 2025-02-12 PROBLEM — R53.81 MALAISE AND FATIGUE: Status: RESOLVED | Noted: 2023-03-22 | Resolved: 2025-02-12

## 2025-02-12 PROBLEM — R53.83 MALAISE AND FATIGUE: Status: RESOLVED | Noted: 2023-03-22 | Resolved: 2025-02-12

## 2025-02-12 PROBLEM — I63.9 CEREBRAL INFARCTION, UNSPECIFIED: Status: RESOLVED | Noted: 2024-01-25 | Resolved: 2025-02-12

## 2025-02-12 PROBLEM — R73.03 PREDIABETES: Status: RESOLVED | Noted: 2023-03-22 | Resolved: 2025-02-12

## 2025-02-12 PROCEDURE — 90662 IIV NO PRSV INCREASED AG IM: CPT | Performed by: FAMILY MEDICINE

## 2025-02-12 PROCEDURE — G0439 PPPS, SUBSEQ VISIT: HCPCS | Performed by: FAMILY MEDICINE

## 2025-02-12 PROCEDURE — 1036F TOBACCO NON-USER: CPT | Performed by: FAMILY MEDICINE

## 2025-02-12 PROCEDURE — G0009 ADMIN PNEUMOCOCCAL VACCINE: HCPCS | Performed by: FAMILY MEDICINE

## 2025-02-12 PROCEDURE — 1158F ADVNC CARE PLAN TLK DOCD: CPT | Performed by: FAMILY MEDICINE

## 2025-02-12 PROCEDURE — 99397 PER PM REEVAL EST PAT 65+ YR: CPT | Performed by: FAMILY MEDICINE

## 2025-02-12 PROCEDURE — 99213 OFFICE O/P EST LOW 20 MIN: CPT | Performed by: FAMILY MEDICINE

## 2025-02-12 PROCEDURE — 1123F ACP DISCUSS/DSCN MKR DOCD: CPT | Performed by: FAMILY MEDICINE

## 2025-02-12 PROCEDURE — 1157F ADVNC CARE PLAN IN RCRD: CPT | Performed by: FAMILY MEDICINE

## 2025-02-12 PROCEDURE — 1159F MED LIST DOCD IN RCRD: CPT | Performed by: FAMILY MEDICINE

## 2025-02-12 PROCEDURE — 1170F FXNL STATUS ASSESSED: CPT | Performed by: FAMILY MEDICINE

## 2025-02-12 PROCEDURE — 90677 PCV20 VACCINE IM: CPT | Performed by: FAMILY MEDICINE

## 2025-02-12 PROCEDURE — G0008 ADMIN INFLUENZA VIRUS VAC: HCPCS | Performed by: FAMILY MEDICINE

## 2025-02-12 RX ORDER — OMEPRAZOLE 20 MG/1
20 CAPSULE, DELAYED RELEASE ORAL
Qty: 90 CAPSULE | Refills: 1 | Status: SHIPPED | OUTPATIENT
Start: 2025-02-12 | End: 2025-08-11

## 2025-02-12 RX ORDER — SERTRALINE HYDROCHLORIDE 25 MG/1
25 TABLET, FILM COATED ORAL DAILY
Qty: 90 TABLET | Refills: 1 | Status: SHIPPED | OUTPATIENT
Start: 2025-02-12 | End: 2025-08-11

## 2025-02-12 ASSESSMENT — PATIENT HEALTH QUESTIONNAIRE - PHQ9
SUM OF ALL RESPONSES TO PHQ9 QUESTIONS 1 AND 2: 0
1. LITTLE INTEREST OR PLEASURE IN DOING THINGS: NOT AT ALL
2. FEELING DOWN, DEPRESSED OR HOPELESS: NOT AT ALL

## 2025-02-12 ASSESSMENT — ENCOUNTER SYMPTOMS
LOSS OF SENSATION IN FEET: 0
OCCASIONAL FEELINGS OF UNSTEADINESS: 0
DEPRESSION: 0

## 2025-02-12 ASSESSMENT — ACTIVITIES OF DAILY LIVING (ADL)
TAKING_MEDICATION: INDEPENDENT
DRESSING: INDEPENDENT
BATHING: INDEPENDENT
GROCERY_SHOPPING: INDEPENDENT
DOING_HOUSEWORK: INDEPENDENT
MANAGING_FINANCES: INDEPENDENT

## 2025-02-12 NOTE — ASSESSMENT & PLAN NOTE
As we discussed, your cholesterol levels remain chronically elevated. However, I am pleased to note that your recent CT calcium score, along with the [facility] screening results you provided, indicates that you have minimal risk for cardiovascular events at this time.    Given your history of intolerance to cholesterol-lowering medications, we will continue to defer pharmacological treatment. It is important to maintain your current lifestyle, including staying active and adhering to a well-balanced diet, to help manage your cholesterol levels.    Please continue with your healthy lifestyle choices, and we will review your lipid panel results once they are available.

## 2025-02-12 NOTE — PROGRESS NOTES
Subjective   Reason for Visit: Padmaja Blunt is an 79 y.o. female here for a Medicare Annual Wellness Visit Subsequent, Anxiety, and GERD.     Past Medical, Surgical, and Family History reviewed and updated in chart.    Reviewed all medications by prescribing practitioner or clinical pharmacist (such as prescriptions, OTCs, herbal therapies and supplements) and documented in the medical record.    HPI  1.  Medicare wellness/physical exam.  Pap: aged out  Mammogram: last done June 2022, no longer wishes to pursue mammogram  Colonoscopy: last done 2016, next due 2026, technically aged out, not interested in pursuing at this time  Immunizations: It says that we ordered the flu shot on October 4 but it was never documented  Dexa scan in March 2024 revealing osteopenia, essentially unchanged since prior    2.  Chronic depression.  Currently stable on Zoloft 25 mg daily  Denies side effects and requesting refills     3.  GERD.  Currently stable with omeprazole 20 mg daily    4.  Hyperlipidemia  Hx of intolerance to statins, so we no longer treat for this concern  CT cardiac score in March 2024 was 4.45    Review of Systems  All pertinent positive symptoms are included in the history of present illness.    All other systems have been reviewed and are negative and noncontributory to this patient's current ailments.    Past Medical History:   Diagnosis Date    Depression, unspecified 12/09/2022    Chronic depression    Encounter for general adult medical examination without abnormal findings 06/11/2021    Encounter for Medicare annual wellness exam    Encounter for general adult medical examination without abnormal findings 01/24/2020    Encounter for Medicare annual wellness exam    Hyperlipidemia, unspecified 12/09/2022    Hyperlipidemia    Other abnormal findings on diagnostic imaging of central nervous system 01/11/2017    MRI of brain abnormal    Other malaise 01/11/2017    Physical deconditioning    Personal history  of other diseases of the musculoskeletal system and connective tissue 06/11/2021    History of connective tissue disease    Personal history of other diseases of the respiratory system 11/19/2018    History of paranasal sinus congestion    Radiculopathy, lumbar region     Left lumbar radiculopathy    Restlessness and agitation 06/11/2021    Restless upper extremity     Past Surgical History:   Procedure Laterality Date    BREAST BIOPSY  06/09/2014    Biopsy Breast Open    COLONOSCOPY  06/09/2014    Complete Colonoscopy    MR HEAD ANGIO WO IV CONTRAST  11/29/2016    MR HEAD ANGIO WO IV CONTRAST 11/29/2016 [facility]    OTHER SURGICAL HISTORY  06/09/2014    Drainage Of Ovarian Cyst(S)    TUBAL LIGATION  06/09/2014    Tubal Ligation     Social History     Tobacco Use    Smoking status: Never    Smokeless tobacco: Never     Family History   Problem Relation Name Age of Onset    Hyperlipidemia Mother      Hypothyroidism Mother      Other (Cerebral hemorrhage) Father       Allergies   Allergen Reactions    Cortisone Hives    Gabapentin Angioedema    Sulfa (Sulfonamide Antibiotics) Unknown     Immunization History   Administered Date(s) Administered    Flu vaccine (IIV4), preservative free *Check age/dose* 10/16/2014    Flu vaccine, quadrivalent, high-dose, preservative free, age 65y+ (FLUZONE) 11/07/2023    Flu vaccine, trivalent, preservative free, HIGH-DOSE, age 65y+ (Fluzone) 02/12/2025    Influenza, Unspecified 10/08/2020, 12/09/2022    Influenza, seasonal, injectable 10/16/2012    Moderna SARS-CoV-2 Vaccination 02/03/2021, 03/03/2021, 11/20/2021, 06/04/2022    Pneumococcal Conjugate, Unspecified 12/14/2018    Pneumococcal conjugate vaccine, 20-valent (PREVNAR 20) 02/12/2025    Pneumococcal polysaccharide vaccine, 23-valent, age 2 years and older (PNEUMOVAX 23) 08/07/2019    RSV, 60 Years And Older (AREXVY) 10/08/2024    Td vaccine, age 7 years and older (TENIVAC) 01/23/2025    Tdap vaccine, age 7 year and  "older (BOOSTRIX, ADACEL) 05/01/2013, 08/11/2015    Zoster vaccine, recombinant, adult (SHINGRIX) 02/01/2022, 04/18/2022    Zoster, Unspecified 07/11/2012     Current Outpatient Medications   Medication Instructions    omeprazole (PRILOSEC) 20 mg, oral, Daily before breakfast    sertraline (ZOLOFT) 25 mg, oral, Daily       Patient Self Assessment of Health Status  Patient Self Assessment: Good    Nutrition and Exercise  Current Diet: Heart Healthy Diet  Adequate Fluid Intake: Yes  Caffeine: Yes  Exercise Frequency: Infrequently    Functional Ability/Level of Safety  Cognitive Impairment Observed: No cognitive impairment observed  Cognitive Impairment Reported: No cognitive impairment reported by patient or family    Home Safety Risk Factors: None    Objective   Visit Vitals  /72   Pulse 66   Ht 1.575 m (5' 2\")   Wt 59.9 kg (132 lb)   BMI 24.14 kg/m²   Smoking Status Never   BSA 1.62 m²      No visits with results within 1 Month(s) from this visit.   Latest known visit with results is:   Lab on 08/08/2024   Component Date Value    Glucose 08/08/2024 87     Sodium 08/08/2024 140     Potassium 08/08/2024 4.2     Chloride 08/08/2024 106     Bicarbonate 08/08/2024 26     Anion Gap 08/08/2024 12     Urea Nitrogen 08/08/2024 23     Creatinine 08/08/2024 0.73     eGFR 08/08/2024 84     Calcium 08/08/2024 9.7     Albumin 08/08/2024 4.3     Alkaline Phosphatase 08/08/2024 81     Total Protein 08/08/2024 7.6     AST 08/08/2024 18     Bilirubin, Total 08/08/2024 0.5     ALT 08/08/2024 9     Cholesterol 08/08/2024 380 (H)     HDL-Cholesterol 08/08/2024 42.1     Cholesterol/HDL Ratio 08/08/2024 9.0     LDL Calculated 08/08/2024 293 (H)     VLDL 08/08/2024 45 (H)     Triglycerides 08/08/2024 225 (H)     Non HDL Cholesterol 08/08/2024 338 (H)      The ASCVD Risk score (Tejinder CLEARY, et al., 2019) failed to calculate for the following reasons:    The valid total cholesterol range is 130 to 320 mg/dL    Physical " Exam  CONSTITUTIONAL - well nourished, well developed, looks like stated age, in no acute distress, not ill-appearing, and not tired appearing  SKIN - normal skin color and pigmentation, normal skin turgor without rash, lesions, or nodules visualized  HEAD - no trauma, normocephalic  NECK - supple without rigidity, no neck mass was observed, no thyromegaly or thyroid nodules  CHEST - clear to auscultation, no wheezing, no crackles and no rales, good effort  CARDIAC - regular rate and regular rhythm, no skipped beats, no murmur  ABDOMEN - no organomegaly, soft, nontender, nondistended, normal bowel sounds, no guarding/rebound/rigidity  EXTREMITIES - no obvious or evident edema, no obvious or evident deformities  NEUROLOGICAL - normal gait, normal balance, normal motor, no ataxia, DTRs equal and symmetrical; alert, oriented and no focal signs  PSYCHIATRIC - alert, pleasant and cordial, age-appropriate  IMMUNOLOGIC - no cervical lymphadenopathy    Assessment/Plan   Problem List Items Addressed This Visit       Chronic depression    Current Assessment & Plan     Stable, no changes to medication recommended         Relevant Medications    sertraline (Zoloft) 25 mg tablet    GERD without esophagitis    Current Assessment & Plan     Stable, no changes to medication recommended         Relevant Medications    omeprazole (PriLOSEC) 20 mg DR capsule    Hyperlipidemia    Current Assessment & Plan     As we discussed, your cholesterol levels remain chronically elevated. However, I am pleased to note that your recent CT calcium score, along with the [facility] screening results you provided, indicates that you have minimal risk for cardiovascular events at this time.    Given your history of intolerance to cholesterol-lowering medications, we will continue to defer pharmacological treatment. It is important to maintain your current lifestyle, including staying active and adhering to a well-balanced diet, to help manage your  cholesterol levels.    Please continue with your healthy lifestyle choices, and we will review your lipid panel results once they are available.         Flu vaccine need    Current Assessment & Plan     All questions were answered and you were counseled on immunization(s) in detail and vaccination was provided         Relevant Orders    Flu vaccine, trivalent, preservative free, HIGH-DOSE, age 65y+ (Fluzone) (Completed)    Medicare annual wellness visit, subsequent - Primary    Current Assessment & Plan     Questions were answered and reviewed  Screening up-to-date  Immunizations updated today         Physical exam, annual    Current Assessment & Plan     Complete history and physical examination was performed          Other Visit Diagnoses       Need for pneumococcal 20-valent conjugate vaccination        All questions were answered and you were counseled on immunization(s) in detail and vaccination was provided    Relevant Orders    Pneumococcal conjugate vaccine, 20-valent (PREVNAR 20) (Completed)          Patient Care Team:  Isaias Live DO as PCP - General (Family Medicine)  Isaias Live DO as PCP - [employer] PCP

## 2025-04-21 ENCOUNTER — HOSPITAL ENCOUNTER (OUTPATIENT)
Dept: RADIOLOGY | Facility: CLINIC | Age: 80
Discharge: HOME | End: 2025-04-21
Payer: MEDICARE

## 2025-04-21 ENCOUNTER — APPOINTMENT (OUTPATIENT)
Dept: PRIMARY CARE | Facility: CLINIC | Age: 80
End: 2025-04-21
Payer: MEDICARE

## 2025-04-21 VITALS
HEIGHT: 62 IN | WEIGHT: 128 LBS | BODY MASS INDEX: 23.55 KG/M2 | SYSTOLIC BLOOD PRESSURE: 118 MMHG | HEART RATE: 60 BPM | DIASTOLIC BLOOD PRESSURE: 60 MMHG

## 2025-04-21 DIAGNOSIS — R51.9 ACUTE INTRACTABLE HEADACHE, UNSPECIFIED HEADACHE TYPE: ICD-10-CM

## 2025-04-21 DIAGNOSIS — R40.4 TRANSIENT ALTERATION OF AWARENESS: ICD-10-CM

## 2025-04-21 DIAGNOSIS — R51.9 ACUTE INTRACTABLE HEADACHE, UNSPECIFIED HEADACHE TYPE: Primary | ICD-10-CM

## 2025-04-21 PROCEDURE — 1160F RVW MEDS BY RX/DR IN RCRD: CPT | Performed by: FAMILY MEDICINE

## 2025-04-21 PROCEDURE — 1159F MED LIST DOCD IN RCRD: CPT | Performed by: FAMILY MEDICINE

## 2025-04-21 PROCEDURE — 1036F TOBACCO NON-USER: CPT | Performed by: FAMILY MEDICINE

## 2025-04-21 PROCEDURE — 99215 OFFICE O/P EST HI 40 MIN: CPT | Performed by: FAMILY MEDICINE

## 2025-04-21 PROCEDURE — 70450 CT HEAD/BRAIN W/O DYE: CPT | Performed by: STUDENT IN AN ORGANIZED HEALTH CARE EDUCATION/TRAINING PROGRAM

## 2025-04-21 PROCEDURE — 1157F ADVNC CARE PLAN IN RCRD: CPT | Performed by: FAMILY MEDICINE

## 2025-04-21 PROCEDURE — 1123F ACP DISCUSS/DSCN MKR DOCD: CPT | Performed by: FAMILY MEDICINE

## 2025-04-21 PROCEDURE — 70450 CT HEAD/BRAIN W/O DYE: CPT

## 2025-04-21 RX ORDER — SUMATRIPTAN SUCCINATE 100 MG/1
TABLET ORAL
Qty: 9 TABLET | Refills: 5 | Status: SHIPPED
Start: 2025-04-21 | End: 2025-04-25 | Stop reason: SINTOL

## 2025-04-21 NOTE — ASSESSMENT & PLAN NOTE
Your experience of blackout moments is concerning, and while I'm not certain of the cause at this time, it may be necessary to involve a neurologist in your care. Their expertise could be crucial in further evaluating and managing these symptoms based on the details you've described.    Again, we will review all test data, provide you with a medication, and then make further recommendations based on your response to the medication along with your test data.

## 2025-04-21 NOTE — ASSESSMENT & PLAN NOTE
Let's proceed with obtaining some blood work and a stat CT of your head to evaluate for any underlying issues. We've discussed several potential causes of your symptoms, such as shingles without the rash, trigeminal neuralgia, temporal arteritis, occipital neuralgia, migraine, tension-type headache, and more serious conditions like a tumor, TIA, or subdural hematoma.     At the time this note was being completed, the CT scan results have shown no intracranial pathology. I recommend starting sumatriptan to help manage your symptoms.  I notified you of these results at 3:37 PM.    We'll also review your blood work, and if it's normal and the sumatriptan does not provide relief, we may need to consider an MRI of your brain.

## 2025-04-21 NOTE — PROGRESS NOTES
"Chief Complaint  Patient ID: Padmaja Blunt \"Mia\" is a 79 y.o. female who presents for Headache.    Past Medical, Surgical, and Family History reviewed and updated in chart.    Reviewed all medications by prescribing practitioner or clinical pharmacist (such as prescriptions, OTCs, herbal therapies and supplements) and documented in the medical record.    History of Present Illness  Mai has been experiencing headaches for the past two weeks without any injury or trauma. The headache is primarily located in the right parietal area and occasionally radiates to the right orbit. She has not noticed any rash that would suggest shingles. Mai reports that her psoriasis is worsening, but this does not seem to correlate with the headache pain.    She has been using Advil and Tylenol, which sometimes provide relief for about an hour before the pain returns.    Mai describes episodes she characterizes as \"almost blackout sensations.\" Although she does not experience a syncopal event, she reports brief periods where she seems to \"travel through time.\" For instance, she recalls getting up from a chair in a restaurant after having headache pain prior to going there. Despite initially contemplating not going, she decided to join her friends. After having an alcoholic drink and some food, she got up from her chair, and the next thing she remembers is being outside the restaurant 3 to 4 minutes later, with no recollection of how she got there. Her friends assured her that she appeared to be acting normally and nothing unusual occurred during that time.    At its worst, the headache pain is rated at about 5-6/10, but it is constant and has persisted for the past two weeks. The area of concern on her parietal scalp is sometimes very sensitive to touch. Mai has also been experiencing some visual disturbances, particularly in the morning, prompting her to splash cold water on her eyes, which is a new symptom for " her.    Review of Systems  All pertinent positive symptoms are included in the history of present illness.    All other systems have been reviewed and are negative and noncontributory to this patient's current ailments.    Past Medical History  She has a past medical history of Depression, unspecified (12/09/2022), Encounter for general adult medical examination without abnormal findings (06/11/2021), Encounter for general adult medical examination without abnormal findings (01/24/2020), Hyperlipidemia, unspecified (12/09/2022), Other abnormal findings on diagnostic imaging of central nervous system (01/11/2017), Other malaise (01/11/2017), Personal history of other diseases of the musculoskeletal system and connective tissue (06/11/2021), Personal history of other diseases of the respiratory system (11/19/2018), Radiculopathy, lumbar region, and Restlessness and agitation (06/11/2021).    Surgical History  She has a past surgical history that includes Tubal ligation (06/09/2014); Breast biopsy (06/09/2014); Colonoscopy (06/09/2014); Other surgical history (06/09/2014); and MR angio head wo IV contrast (11/29/2016).     Social History  She reports that she has never smoked. She has never used smokeless tobacco. No history on file for alcohol use and drug use.    Family History[1]  Medications Prior to Visit[2]  Allergies  Cortisone, Gabapentin, and Sulfa (sulfonamide antibiotics)    Immunization History   Administered Date(s) Administered    Flu vaccine (IIV4), preservative free *Check age/dose* 10/16/2014    Flu vaccine, quadrivalent, high-dose, preservative free, age 65y+ (FLUZONE) 11/07/2023    Flu vaccine, trivalent, preservative free, HIGH-DOSE, age 65y+ (Fluzone) 02/12/2025    Influenza, Unspecified 10/08/2020, 12/09/2022    Influenza, seasonal, injectable 10/16/2012    Moderna SARS-CoV-2 Vaccination 02/03/2021, 03/03/2021, 11/20/2021, 06/04/2022    Pneumococcal Conjugate, Unspecified 12/14/2018     "Pneumococcal conjugate vaccine, 20-valent (PREVNAR 20) 02/12/2025    Pneumococcal polysaccharide vaccine, 23-valent, age 2 years and older (PNEUMOVAX 23) 08/07/2019    RSV, 60 Years And Older (AREXVY) 10/08/2024    Td vaccine, age 7 years and older (TENIVAC) 01/23/2025    Tdap vaccine, age 7 year and older (BOOSTRIX, ADACEL) 05/01/2013, 08/11/2015    Zoster vaccine, recombinant, adult (SHINGRIX) 02/01/2022, 04/18/2022    Zoster, Unspecified 07/11/2012     Objective   Visit Vitals  /60   Pulse 60   Ht 1.575 m (5' 2\")   Wt 58.1 kg (128 lb)   BMI 23.41 kg/m²   Smoking Status Never   BSA 1.59 m²        BP Readings from Last 3 Encounters:   04/21/25 118/60   02/12/25 120/72   08/07/24 118/68      Wt Readings from Last 3 Encounters:   04/21/25 58.1 kg (128 lb)   02/12/25 59.9 kg (132 lb)   01/23/25 62.3 kg (137 lb 6.4 oz)        Relevant Results  No visits with results within 1 Month(s) from this visit.   Latest known visit with results is:   Lab on 08/08/2024   Component Date Value    Glucose 08/08/2024 87     Sodium 08/08/2024 140     Potassium 08/08/2024 4.2     Chloride 08/08/2024 106     Bicarbonate 08/08/2024 26     Anion Gap 08/08/2024 12     Urea Nitrogen 08/08/2024 23     Creatinine 08/08/2024 0.73     eGFR 08/08/2024 84     Calcium 08/08/2024 9.7     Albumin 08/08/2024 4.3     Alkaline Phosphatase 08/08/2024 81     Total Protein 08/08/2024 7.6     AST 08/08/2024 18     Bilirubin, Total 08/08/2024 0.5     ALT 08/08/2024 9     Cholesterol 08/08/2024 380 (H)     HDL-Cholesterol 08/08/2024 42.1     Cholesterol/HDL Ratio 08/08/2024 9.0     LDL Calculated 08/08/2024 293 (H)     VLDL 08/08/2024 45 (H)     Triglycerides 08/08/2024 225 (H)     Non HDL Cholesterol 08/08/2024 338 (H)      Interpreted By:  Franco Thomas,   STUDY: CT HEAD WO IV CONTRAST;  4/21/2025 1:56 pm      INDICATION:  Signs/Symptoms:R parietal headache 2 weeks radiates into R orbit  infrequently; no injury or trauma; some AM visual " disturbances.      ,R51.9 Headache, unspecified,R40.4 Transient alteration of awareness      COMPARISON:  03/12/2017    ORDERING CLINICIAN:  UBALDO GUNTER      TECHNIQUE:  Noncontrast axial CT images of head were obtained with coronal and  sagittal reconstructed images.      FINDINGS:  BRAIN PARENCHYMA:  No acute intraparenchymal hemorrhage or  parenchymal evidence of acute large territory ischemic infarct.  Gray-white matter distinction is preserved. No mass-effect.      VENTRICLES and EXTRA-AXIAL SPACES:  No acute extra-axial or  intraventricular hemorrhage. No effacement of cerebral sulci. The  ventricles and sulci are age-concordant.      PARANASAL SINUSES/MASTOIDS:  No hemorrhage or air-fluid levels within  the visualized paranasal sinuses. The mastoids are well aerated.      CALVARIUM/ORBITS:  No acute skull fracture.  The orbits and globes  are intact to the extent visualized.      EXTRACRANIAL SOFT TISSUES: No discernible acute abnormality.      IMPRESSION:  No acute intracranial abnormality.    Signed by: Franco Thomas 4/21/2025 3:20 PM    Physical Exam  CONSTITUTIONAL - well nourished, well developed, looks like stated age, in no acute distress, not ill-appearing, and not tired appearing  SKIN - normal skin color and pigmentation, no rash noted with the exception of some dry flaky patches of skin on the scalp  HEAD - atraumatic, sensitive to touch in the right parietal area, no swelling, redness, or any signs of infection; no skull deformities  EYES - normal external exam  LUNGS - breathing comfortably, no dyspnea  EXTREMITIES - no deformities noticeable  NEUROLOGICAL - oriented and no focal signs, cranial nerves II through XII grossly intact  PSYCHIATRIC - alert, pleasant and cordial, age-appropriate, not anxious or depressed appearing    Assessment and Plan  Problem List Items Addressed This Visit       Acute intractable headache - Primary    Let's proceed with obtaining some blood work and a stat CT  of your head to evaluate for any underlying issues. We've discussed several potential causes of your symptoms, such as shingles without the rash, trigeminal neuralgia, temporal arteritis, occipital neuralgia, migraine, tension-type headache, and more serious conditions like a tumor, TIA, or subdural hematoma.     At the time this note was being completed, the CT scan results have shown no intracranial pathology. I recommend starting sumatriptan to help manage your symptoms.  I notified you of these results at 3:37 PM.    We'll also review your blood work, and if it's normal and the sumatriptan does not provide relief, we may need to consider an MRI of your brain.         Relevant Medications    SUMAtriptan (Imitrex) 100 mg tablet    Other Relevant Orders    CT head wo IV contrast (Completed)    C-reactive protein    Sedimentation Rate    CBC and Auto Differential    TSH with reflex to Free T4 if abnormal    Comprehensive Metabolic Panel    Transient alteration of awareness    Your experience of blackout moments is concerning, and while I'm not certain of the cause at this time, it may be necessary to involve a neurologist in your care. Their expertise could be crucial in further evaluating and managing these symptoms based on the details you've described.    Again, we will review all test data, provide you with a medication, and then make further recommendations based on your response to the medication along with your test data.         Relevant Orders    CT head wo IV contrast (Completed)    C-reactive protein    Sedimentation Rate    CBC and Auto Differential    TSH with reflex to Free T4 if abnormal    Comprehensive Metabolic Panel          [1]   Family History  Problem Relation Name Age of Onset    Hyperlipidemia Mother      Hypothyroidism Mother      Other (Cerebral hemorrhage) Father     [2]   Outpatient Medications Prior to Visit   Medication Sig Dispense Refill    omeprazole (PriLOSEC) 20 mg DR capsule Take  1 capsule (20 mg) by mouth once daily in the morning. Take before meals. 90 capsule 1    sertraline (Zoloft) 25 mg tablet Take 1 tablet (25 mg) by mouth once daily. 90 tablet 1     No facility-administered medications prior to visit.

## 2025-04-22 LAB
ALBUMIN SERPL-MCNC: 4.5 G/DL (ref 3.6–5.1)
ALP SERPL-CCNC: 87 U/L (ref 37–153)
ALT SERPL-CCNC: 8 U/L (ref 6–29)
ANION GAP SERPL CALCULATED.4IONS-SCNC: 9 MMOL/L (CALC) (ref 7–17)
AST SERPL-CCNC: 15 U/L (ref 10–35)
BASOPHILS # BLD AUTO: 48 CELLS/UL (ref 0–200)
BASOPHILS NFR BLD AUTO: 0.7 %
BILIRUB SERPL-MCNC: 0.4 MG/DL (ref 0.2–1.2)
BUN SERPL-MCNC: 14 MG/DL (ref 7–25)
CALCIUM SERPL-MCNC: 9.8 MG/DL (ref 8.6–10.4)
CHLORIDE SERPL-SCNC: 103 MMOL/L (ref 98–110)
CO2 SERPL-SCNC: 27 MMOL/L (ref 20–32)
CREAT SERPL-MCNC: 0.68 MG/DL (ref 0.6–1)
CRP SERPL-MCNC: 3.1 MG/L
EGFRCR SERPLBLD CKD-EPI 2021: 89 ML/MIN/1.73M2
EOSINOPHIL # BLD AUTO: 136 CELLS/UL (ref 15–500)
EOSINOPHIL NFR BLD AUTO: 2 %
ERYTHROCYTE [DISTWIDTH] IN BLOOD BY AUTOMATED COUNT: 13.2 % (ref 11–15)
ERYTHROCYTE [SEDIMENTATION RATE] IN BLOOD BY WESTERGREN METHOD: 25 MM/H
GLUCOSE SERPL-MCNC: 93 MG/DL (ref 65–99)
HCT VFR BLD AUTO: 39.2 % (ref 35–45)
HGB BLD-MCNC: 13 G/DL (ref 11.7–15.5)
LYMPHOCYTES # BLD AUTO: 1442 CELLS/UL (ref 850–3900)
LYMPHOCYTES NFR BLD AUTO: 21.2 %
MCH RBC QN AUTO: 30 PG (ref 27–33)
MCHC RBC AUTO-ENTMCNC: 33.2 G/DL (ref 32–36)
MCV RBC AUTO: 90.3 FL (ref 80–100)
MONOCYTES # BLD AUTO: 360 CELLS/UL (ref 200–950)
MONOCYTES NFR BLD AUTO: 5.3 %
NEUTROPHILS # BLD AUTO: 4814 CELLS/UL (ref 1500–7800)
NEUTROPHILS NFR BLD AUTO: 70.8 %
PLATELET # BLD AUTO: 287 THOUSAND/UL (ref 140–400)
PMV BLD REES-ECKER: 9.6 FL (ref 7.5–12.5)
POTASSIUM SERPL-SCNC: 4.4 MMOL/L (ref 3.5–5.3)
PROT SERPL-MCNC: 7.3 G/DL (ref 6.1–8.1)
RBC # BLD AUTO: 4.34 MILLION/UL (ref 3.8–5.1)
SODIUM SERPL-SCNC: 139 MMOL/L (ref 135–146)
TSH SERPL-ACNC: 1.16 MIU/L (ref 0.4–4.5)
WBC # BLD AUTO: 6.8 THOUSAND/UL (ref 3.8–10.8)

## 2025-04-22 NOTE — RESULT ENCOUNTER NOTE
Your recent blood tests show that everything is normal, including your inflammatory markers, complete blood cell count, thyroid function, blood sugar, kidney and liver function, and electrolytes. This means your headache is not related to temporal arteritis.

## 2025-04-25 DIAGNOSIS — G44.201 ACUTE INTRACTABLE TENSION-TYPE HEADACHE: Primary | ICD-10-CM

## 2025-04-25 RX ORDER — TOPIRAMATE 50 MG/1
TABLET, FILM COATED ORAL
Qty: 49 TABLET | Refills: 0 | Status: SHIPPED | OUTPATIENT
Start: 2025-04-25 | End: 2025-05-23

## 2025-07-20 ENCOUNTER — OFFICE VISIT (OUTPATIENT)
Dept: URGENT CARE | Age: 80
End: 2025-07-20
Payer: MEDICARE

## 2025-07-20 VITALS
OXYGEN SATURATION: 96 % | BODY MASS INDEX: 22.86 KG/M2 | WEIGHT: 125 LBS | HEART RATE: 80 BPM | SYSTOLIC BLOOD PRESSURE: 114 MMHG | TEMPERATURE: 98.7 F | RESPIRATION RATE: 18 BRPM | DIASTOLIC BLOOD PRESSURE: 87 MMHG

## 2025-07-20 DIAGNOSIS — L23.7 POISON IVY DERMATITIS: Primary | ICD-10-CM

## 2025-07-20 RX ORDER — PREDNISONE 20 MG/1
40 TABLET ORAL DAILY
Qty: 10 TABLET | Refills: 0 | Status: SHIPPED | OUTPATIENT
Start: 2025-07-20 | End: 2025-07-25

## 2025-07-20 NOTE — PROGRESS NOTES
"Subjective   Patient ID: Padmaja Blunt \"Alan" is a 79 y.o. female. They present today with a chief complaint of Poison Ivy.    History of Present Illness    Poison Ivy  Associated symptoms: rash        79-year-old female patient presents today for concerns of poison ivy dermatitis.  She states that she has had it in the past; the irritation initially started on her left forearm, and has spread to her right forearm, her neck, and her forehead.  She is not having any difficulties breathing.  She has used a poison ivy wash at home as well as taken Benadryl to help with the itching, which has provided her with minimal relief.  She is here for evaluation.  Past Medical History  Allergies as of 07/20/2025 - Reviewed 07/20/2025   Allergen Reaction Noted    Gabapentin Angioedema 03/22/2023    Sulfa (sulfonamide antibiotics) Unknown 03/22/2023       Prescriptions Prior to Admission[1]     Medical History[2]    Surgical History[3]     reports that she has never smoked. She has never used smokeless tobacco.    Review of Systems  Review of Systems   Skin:  Positive for rash.                                  Objective    Vitals:    07/20/25 1211   BP: 114/87   Pulse: 80   Resp: 18   Temp: 37.1 °C (98.7 °F)   SpO2: 96%   Weight: 56.7 kg (125 lb)     No LMP recorded.    Physical Exam    Skin:     Comments: Sporadic patches of poison ivy contact dermatitis on bilateral forearms, neck, and forehead         Procedures    Point of Care Test & Imaging Results from this visit  No results found for this visit on 07/20/25.   Imaging  No results found.    Cardiology, Vascular, and Other Imaging  No other imaging results found for the past 2 days      Diagnostic study results (if any) were reviewed by WILFREDO Carpio.    Assessment/Plan   Allergies, medications, history, and pertinent labs/EKGs/Imaging reviewed by WILFREDO Carpio.     Medical Decision Making  Sporadic patches of poison ivy contact " dermatitis noted.  Patient agreeable to prednisone from in-house pharmacy; medication education provided.  Recommend continuing use of daily antihistamine to help with symptoms.  Patient in no acute distress and hemodynamically stable at time of discharge. As a result of the work-up, the patient was discharged home.  she was informed of her diagnosis and instructed to come back with any concerns or worsening of condition.  she and was agreeable to the plan as discussed above.  she was given the opportunity to ask questions.  All of the patient's questions were answered.    Orders and Diagnoses  Diagnoses and all orders for this visit:  Poison ivy dermatitis  -     predniSONE (Deltasone) 20 mg tablet; Take 2 tablets (40 mg) by mouth once daily for 5 days.      Medical Admin Record      Patient disposition: Home    Electronically signed by WILFREDO Carpio  12:25 PM           [1] (Not in a hospital admission)   [2]   Past Medical History:  Diagnosis Date    Depression, unspecified 12/09/2022    Chronic depression    Encounter for general adult medical examination without abnormal findings 06/11/2021    Encounter for Medicare annual wellness exam    Encounter for general adult medical examination without abnormal findings 01/24/2020    Encounter for Medicare annual wellness exam    Hyperlipidemia, unspecified 12/09/2022    Hyperlipidemia    Other abnormal findings on diagnostic imaging of central nervous system 01/11/2017    MRI of brain abnormal    Other malaise 01/11/2017    Physical deconditioning    Personal history of other diseases of the musculoskeletal system and connective tissue 06/11/2021    History of connective tissue disease    Personal history of other diseases of the respiratory system 11/19/2018    History of paranasal sinus congestion    Radiculopathy, lumbar region     Left lumbar radiculopathy    Restlessness and agitation 06/11/2021    Restless upper extremity   [3]   Past  Surgical History:  Procedure Laterality Date    BREAST BIOPSY  06/09/2014    Biopsy Breast Open    COLONOSCOPY  06/09/2014    Complete Colonoscopy    MR HEAD ANGIO WO IV CONTRAST  11/29/2016    MR HEAD ANGIO WO IV CONTRAST 11/29/2016 [facility]    OTHER SURGICAL HISTORY  06/09/2014    Drainage Of Ovarian Cyst(S)    TUBAL LIGATION  06/09/2014    Tubal Ligation

## 2025-07-25 ENCOUNTER — OFFICE VISIT (OUTPATIENT)
Dept: PRIMARY CARE | Facility: CLINIC | Age: 80
End: 2025-07-25
Payer: MEDICARE

## 2025-07-25 VITALS
WEIGHT: 127.6 LBS | BODY MASS INDEX: 23.34 KG/M2 | DIASTOLIC BLOOD PRESSURE: 76 MMHG | HEART RATE: 82 BPM | SYSTOLIC BLOOD PRESSURE: 126 MMHG

## 2025-07-25 DIAGNOSIS — L23.7 POISON IVY: Primary | ICD-10-CM

## 2025-07-25 RX ORDER — TRIAMCINOLONE ACETONIDE 40 MG/ML
40 INJECTION, SUSPENSION INTRA-ARTICULAR; INTRAMUSCULAR ONCE
Status: COMPLETED | OUTPATIENT
Start: 2025-07-25 | End: 2025-07-25

## 2025-07-25 RX ADMIN — TRIAMCINOLONE ACETONIDE 40 MG: 40 INJECTION, SUSPENSION INTRA-ARTICULAR; INTRAMUSCULAR at 15:26

## 2025-07-25 NOTE — PROGRESS NOTES
I reviewed the resident's documentation and discussed the patient with the resident. I agree with the resident's medical decision making as documented in the note.    Emy De La Torre DO, MBA  [facility]

## 2025-07-25 NOTE — PROGRESS NOTES
"Subjective   Patient ID: Padmaja Blunt \"Alan" is a 79 y.o. female who presents for No chief complaint on file..  Today she is accompanied by alone.     HPI  Patient presents with poison ivy  States that has been going on for about a week and 1/2 to 2 weeks.  States that she has had poison ivy in the past, which is usually improved with an oral steroid.  However her irritation initially started on her left forearm, and spread to her right arm neck and forehead.  She has been taking Benadryl to help with the itching.  She saw urgent care on 7/20/2025 where she was given prednisone 40 mg daily for 5 days.  States that that did not help with her poison ivy and it is continuing to spread to different areas.  Reports that she cannot get the itching under control like spreading.  [place]    Medications Ordered Prior to Encounter[1]     Allergies[2]    Immunization History   Administered Date(s) Administered    Flu vaccine (IIV4), preservative free *Check age/dose* 10/16/2014    Flu vaccine, quadrivalent, high-dose, preservative free, age 65y+ (FLUZONE) 11/07/2023    Flu vaccine, trivalent, preservative free, HIGH-DOSE, age 65y+ (Fluzone) 02/12/2025    Influenza, Unspecified 10/08/2020, 12/09/2022    Influenza, seasonal, injectable 10/16/2012    Moderna SARS-CoV-2 Vaccination 02/03/2021, 03/03/2021, 11/20/2021, 06/04/2022    Pneumococcal Conjugate, Unspecified 12/14/2018    Pneumococcal conjugate vaccine, 20-valent (PREVNAR 20) 02/12/2025    Pneumococcal polysaccharide vaccine, 23-valent, age 2 years and older (PNEUMOVAX 23) 08/07/2019    RSV, 60 Years And Older (AREXVY) 10/08/2024    Td vaccine, age 7 years and older (TENIVAC) 01/23/2025    Tdap vaccine, age 7 year and older (BOOSTRIX, ADACEL) 05/01/2013, 08/11/2015    Zoster vaccine, recombinant, adult (SHINGRIX) 02/01/2022, 04/18/2022    Zoster, Unspecified 07/11/2012         Review of Systems  All pertinent positive symptoms " are included in the history of present illness.  All other systems have been reviewed and are negative and noncontributory to this patient's current ailments.     Objective   /76 (BP Location: Left arm, Patient Position: Sitting, BP Cuff Size: Adult)   Pulse 82   Wt 57.9 kg (127 lb 9.6 oz)   BMI 23.34 kg/m²   BSA: 1.59 meters squared  No visits with results within 1 Month(s) from this visit.   Latest known visit with results is:   Office Visit on 04/21/2025   Component Date Value Ref Range Status    C-REACTIVE PROTEIN 04/21/2025 3.1  <8.0 mg/L Final    SED RATE BY MODIFIED WESTERGREN 04/21/2025 25  < OR = 30 mm/h Final    WHITE BLOOD CELL COUNT 04/21/2025 6.8  3.8 - 10.8 Thousand/uL Final    RED BLOOD CELL COUNT 04/21/2025 4.34  3.80 - 5.10 Million/uL Final    HEMOGLOBIN 04/21/2025 13.0  11.7 - 15.5 g/dL Final    HEMATOCRIT 04/21/2025 39.2  35.0 - 45.0 % Final    MCV 04/21/2025 90.3  80.0 - 100.0 fL Final    MCH 04/21/2025 30.0  27.0 - 33.0 pg Final    MCHC 04/21/2025 33.2  32.0 - 36.0 g/dL Final    Comment: For adults, a slight decrease in the calculated MCHC  value (in the range of 30 to 32 g/dL) is most likely  not clinically significant; however, it should be  interpreted with caution in correlation with other  red cell parameters and the patient's clinical  condition.      RDW 04/21/2025 13.2  11.0 - 15.0 % Final    PLATELET COUNT 04/21/2025 287  140 - 400 Thousand/uL Final    MPV 04/21/2025 9.6  7.5 - 12.5 fL Final    ABSOLUTE NEUTROPHILS 04/21/2025 4,814  1,500 - 7,800 cells/uL Final    ABSOLUTE LYMPHOCYTES 04/21/2025 1,442  850 - 3,900 cells/uL Final    ABSOLUTE MONOCYTES 04/21/2025 360  200 - 950 cells/uL Final    ABSOLUTE EOSINOPHILS 04/21/2025 136  15 - 500 cells/uL Final    ABSOLUTE BASOPHILS 04/21/2025 48  0 - 200 cells/uL Final    NEUTROPHILS 04/21/2025 70.8  % Final    LYMPHOCYTES 04/21/2025 21.2  % Final    MONOCYTES 04/21/2025 5.3  % Final    EOSINOPHILS 04/21/2025 2.0  % Final    BASOPHILS  04/21/2025 0.7  % Final    TSH W/REFLEX TO FT4 04/21/2025 1.16  0.40 - 4.50 mIU/L Final    GLUCOSE 04/21/2025 93  65 - 99 mg/dL Final    Comment:               Fasting reference interval         UREA NITROGEN (BUN) 04/21/2025 14  7 - 25 mg/dL Final    CREATININE 04/21/2025 0.68  0.60 - 1.00 mg/dL Final    EGFR 04/21/2025 89  > OR = 60 mL/min/1.73m2 Final    SODIUM 04/21/2025 139  135 - 146 mmol/L Final    POTASSIUM 04/21/2025 4.4  3.5 - 5.3 mmol/L Final    CHLORIDE 04/21/2025 103  98 - 110 mmol/L Final    CARBON DIOXIDE 04/21/2025 27  20 - 32 mmol/L Final    ELECTROLYTE BALANCE 04/21/2025 9  7 - 17 mmol/L (calc) Final    CALCIUM 04/21/2025 9.8  8.6 - 10.4 mg/dL Final    PROTEIN, TOTAL 04/21/2025 7.3  6.1 - 8.1 g/dL Final    ALBUMIN 04/21/2025 4.5  3.6 - 5.1 g/dL Final    BILIRUBIN, TOTAL 04/21/2025 0.4  0.2 - 1.2 mg/dL Final    ALKALINE PHOSPHATASE 04/21/2025 87  37 - 153 U/L Final    AST 04/21/2025 15  10 - 35 U/L Final    ALT 04/21/2025 8  6 - 29 U/L Final       Physical Exam  CONSTITUTIONAL - well nourished, well developed, looks like stated age, in no acute distress, not ill-appearing, and not tired appearing  SKIN - normal skin color and pigmentation, multiple sites with patches of poison ivy dermatitis including bilateral forearms and neck  HEAD - no trauma, normocephalic  EYES - normal external exam  LUNG - clear to auscultation, no wheezing, no crackles and no rales, good effort  CARDIAC - regular rate and regular rhythm, no skipped beats, no murmur  ABDOMEN - no organomegaly, soft, nontender, nondistended, normal bowel sounds  EXTREMITIES - no edema, no deformities  NEUROLOGICAL - normal balance, normal motor, no ataxia  PSYCHIATRIC - alert, pleasant and cordial, age-appropriate      Assessment/Plan   Diagnoses and all orders for this visit:  Poison ivy  After much discussion, we decided to provide a Kenalog injection to help treat the patient's poison ivy as she did fail an outpatient steroid  burst.  Injection was provided to her in office without complication.  You may continue to use Benadryl as needed to help with the itching.  Risks, benefits, and options of treatment(s) were discussed after reviewing all current medication(s) and drug allergy(ies)  Return to the clinic in 7-10 days or sooner if symptoms worsen or persist as we will then further evaluate  -     triamcinolone acetonide (Kenalog-40) injection 40 mg         [1]   Current Outpatient Medications on File Prior to Visit   Medication Sig Dispense Refill    omeprazole (PriLOSEC) 20 mg DR capsule Take 1 capsule (20 mg) by mouth once daily in the morning. Take before meals. 90 capsule 1    predniSONE (Deltasone) 20 mg tablet Take 2 tablets (40 mg) by mouth once daily for 5 days. 10 tablet 0    sertraline (Zoloft) 25 mg tablet Take 1 tablet (25 mg) by mouth once daily. 90 tablet 1    topiramate (Topamax) 50 mg tablet Take 1 tablet (50 mg) by mouth once daily at bedtime for 7 days, THEN 1 tablet (50 mg) 2 times a day for 21 days. 49 tablet 0     No current facility-administered medications on file prior to visit.   [2]   Allergies  Allergen Reactions    Gabapentin Angioedema    Sulfa (Sulfonamide Antibiotics) Unknown

## 2025-09-02 ENCOUNTER — APPOINTMENT (OUTPATIENT)
Dept: PRIMARY CARE | Facility: CLINIC | Age: 80
End: 2025-09-02
Payer: MEDICARE

## 2025-09-02 ASSESSMENT — ACTIVITIES OF DAILY LIVING (ADL)
DOING_HOUSEWORK: INDEPENDENT
DRESSING: INDEPENDENT
TAKING_MEDICATION: INDEPENDENT
GROCERY_SHOPPING: INDEPENDENT
MANAGING_FINANCES: INDEPENDENT
BATHING: INDEPENDENT

## 2025-09-02 ASSESSMENT — ENCOUNTER SYMPTOMS
FATIGUE: 1
OCCASIONAL FEELINGS OF UNSTEADINESS: 0
LIGHT-HEADEDNESS: 0
SHORTNESS OF BREATH: 0
FEVER: 0
CHOKING: 0
SLEEP DISTURBANCE: 0
LOSS OF SENSATION IN FEET: 0
DEPRESSION: 0
DIZZINESS: 0
PALPITATIONS: 0
COUGH: 0
MUSCULOSKELETAL NEGATIVE: 1
WHEEZING: 0
RHINORRHEA: 0
BRUISES/BLEEDS EASILY: 1
NUMBNESS: 0
ACTIVITY CHANGE: 0
APPETITE CHANGE: 0
HEADACHES: 0
SORE THROAT: 0
DYSPHORIC MOOD: 0
CHILLS: 0
NERVOUS/ANXIOUS: 0

## 2025-09-03 ENCOUNTER — RESULTS FOLLOW-UP (OUTPATIENT)
Dept: PRIMARY CARE | Facility: CLINIC | Age: 80
End: 2025-09-03
Payer: MEDICARE

## 2026-03-04 ENCOUNTER — APPOINTMENT (OUTPATIENT)
Dept: PRIMARY CARE | Facility: CLINIC | Age: 81
End: 2026-03-04
Payer: MEDICARE